# Patient Record
Sex: FEMALE | Race: WHITE
[De-identification: names, ages, dates, MRNs, and addresses within clinical notes are randomized per-mention and may not be internally consistent; named-entity substitution may affect disease eponyms.]

---

## 2022-10-31 ENCOUNTER — HOSPITAL ENCOUNTER (OUTPATIENT)
Dept: HOSPITAL 47 - RADMAMWWP | Age: 67
Discharge: HOME | End: 2022-10-31
Attending: INTERNAL MEDICINE
Payer: MEDICARE

## 2022-10-31 DIAGNOSIS — Z12.31: Primary | ICD-10-CM

## 2022-10-31 DIAGNOSIS — M85.88: ICD-10-CM

## 2022-10-31 DIAGNOSIS — N95.1: ICD-10-CM

## 2022-10-31 PROCEDURE — 77067 SCR MAMMO BI INCL CAD: CPT

## 2022-10-31 PROCEDURE — 77063 BREAST TOMOSYNTHESIS BI: CPT

## 2022-10-31 PROCEDURE — 77080 DXA BONE DENSITY AXIAL: CPT

## 2022-10-31 NOTE — BD
EXAMINATION TYPE: Axial Bone Density

 

DATE OF EXAM: 10/31/2022

 

COMPARISON: NONE

 

CLINICAL HISTORY: 67 years year old Female.  ICD-10 CODE: M85.88 DISRD BONE DENS

 

Height:  63

Weight:  223.9

 

FRAX RISK QUESTIONS:

Alcohol (3 or more units per day):  NO

Family History (Parent hip fracture):  NO

Glucocorticoids (More than 3mos):  NO

History of Fracture in Adulthood: FINGER

 

Secondary Osteoporosis:

  1.  Type 1 Diabetes: NO

  2.  Hyperthyroidism: NO

  3.  Menopause before 45: YES

  4.  Malnutrition: NO

  5.  Chronic liver disease: NO

Rheumatoid Arthritis: NO

Current Tobacco Use: NO

 

RISK FACTORS 

HISTORY OF: 

Hip Fracture (Right/Left): NO

Spine Fracture: NO

History of Wrist Fracture: NO

Surgery to Spine/Hip(right/left)/Wrist (right/left): NO

Family History of Osteoporosis: NO

Active: NO

Diet low in dairy products/other sources of calcium:  YES

Postmenopausal woman: YES

Take estrogen and/or progesterone medications: NO

Lost more than 2 inches in height since high school: NO

Frequent falls: NO

Poor Health: YES

Hyperparathyroidism: NO

Adrenal Insufficiency: NO

 

MEDICATIONS: 

Prednisone or other steroids: NO

Thyroid Medications:  SYNTHROID

How Long: PAST MONTH

Osteoporosis Medications: NO

Additional Medications: DIABETIC MEDS, VIT D

 

 

EXAM MEASUREMENTS: 

Bone mineral densitometry was performed using the Code42 System.

Bone mineral density as measured about the Lumbar spine is:  

----- L1-L4(G/cm2): 1.361

T Score Values are as follows:

----- L1: 1.3

----- L2: 1.2

----- L3: 1.9

----- L4: 1.5

----- L1-L4: 1.5

BASELINE STUDY 

 

Bone mineral density about the R hip (g/cm2): 0.711

Bone mineral density about the L hip (g/cm2): 0.741

T Score values are as follows:

-----R Neck: -2.3

-----L Neck: -2.1

-----R Total: -0.8

-----L Total: -0.1

BASELINE STUDY 

 

 

FRAX%s: The graph provided illustrates a 18.7% chance for a major osteoporotic fx and a 3.6% chance f
or the hips probability for fx in 10 years time.

 

IMPRESSION:

Osteopenia (T Score between -2.5 and -1).

 

There is slightly increased risk of fracture and the patient may be considered 

for treatment. 

 

Re-Screen 2-5 years.

 

NOTE:  T-SCORE=SD OF THE YOUNG ADULT MEAN.

## 2022-11-01 NOTE — MM
Reason for Exam: Screening  (asymptomatic). 

Baseline mammogram.





Patient History: 

Menarche at age 14. First Full-Term Pregnancy at age 22. Postmenopausal. 





Risk Values: 

Lucina 5 year model risk: 1.4%.

NCI Lifetime model risk: 4.8%.





Prior Study Comparison: 

Patient's first Mammogram. No prior studies available for comparison. 





Tissue Density: 

There are scattered fibroglandular densities.





Findings: 

Analyzed By CAD. 

There are benign-appearing round and vascular calcifications bilaterally identified.



There is no suspicious group of microcalcifications or suspicious mass in either breast. 





Overall Assessment: Benign, BI-RAD 2





Management: 

Screening Mammogram of both breasts in 1 year.

A clinical breast exam by your physician is recommended on an annual basis and results should be

correlated with mammographic findings.



Electronically signed and approved by: Girish Fischer M.D.

## 2022-11-18 ENCOUNTER — HOSPITAL ENCOUNTER (OUTPATIENT)
Dept: HOSPITAL 47 - OR | Age: 67
Discharge: HOME | End: 2022-11-18
Payer: MEDICARE

## 2022-11-18 VITALS — HEART RATE: 56 BPM

## 2022-11-18 VITALS — DIASTOLIC BLOOD PRESSURE: 88 MMHG | SYSTOLIC BLOOD PRESSURE: 190 MMHG

## 2022-11-18 VITALS — BODY MASS INDEX: 37.8 KG/M2

## 2022-11-18 VITALS — RESPIRATION RATE: 20 BRPM | TEMPERATURE: 97 F

## 2022-11-18 DIAGNOSIS — E11.9: ICD-10-CM

## 2022-11-18 DIAGNOSIS — Z79.84: ICD-10-CM

## 2022-11-18 DIAGNOSIS — E07.9: ICD-10-CM

## 2022-11-18 DIAGNOSIS — H25.11: Primary | ICD-10-CM

## 2022-11-18 DIAGNOSIS — Z79.890: ICD-10-CM

## 2022-11-18 DIAGNOSIS — Z79.899: ICD-10-CM

## 2022-11-18 LAB — GLUCOSE BLD-MCNC: 94 MG/DL (ref 70–110)

## 2022-11-18 PROCEDURE — 66984 XCAPSL CTRC RMVL W/O ECP: CPT

## 2022-11-18 RX ADMIN — CYCLOPENTOLATE HYDROCHLORIDE PRN DROPS: 10 SOLUTION/ DROPS OPHTHALMIC at 11:33

## 2022-11-18 RX ADMIN — PHENYLEPHRINE HYDROCHLORIDE PRN DROPS: 25 SOLUTION/ DROPS OPHTHALMIC at 11:49

## 2022-11-18 RX ADMIN — CYCLOPENTOLATE HYDROCHLORIDE PRN DROPS: 10 SOLUTION/ DROPS OPHTHALMIC at 11:44

## 2022-11-18 RX ADMIN — PHENYLEPHRINE HYDROCHLORIDE PRN DROPS: 25 SOLUTION/ DROPS OPHTHALMIC at 11:38

## 2022-11-18 RX ADMIN — PHENYLEPHRINE HYDROCHLORIDE PRN DROPS: 25 SOLUTION/ DROPS OPHTHALMIC at 11:29

## 2022-11-18 RX ADMIN — CYCLOPENTOLATE HYDROCHLORIDE PRN DROPS: 10 SOLUTION/ DROPS OPHTHALMIC at 11:26

## 2022-11-18 NOTE — P.OP
Date of Procedure: 11/18/22


Preoperative Diagnosis: 


NS & PSC


Postoperative Diagnosis: 


same


Procedure(s) Performed: 


PIOL< OD


Implants: 


MX60E 21.00


Anesthesia: MAC


Surgeon: Rafa Jerez


Pathology: none sent


Condition: stable


Disposition: same day


Indications for Procedure: 


bluerry vision


Operative Findings: 


no complications

## 2022-11-19 NOTE — OP
OPERATIVE REPORT



PROCEDURES PERFORMED:

Phacoemulsification of cataract and intraocular lens implant of the right eye.



PREOPERATIVE DIAGNOSIS:

Nuclear sclerosis and posterior subcapsular cataract.



PREOPERATIVE DIAGNOSIS:

Nuclear sclerosis and posterior subcapsular cataract.



ESTIMATED BLOOD LOSS:

Zero.



SPECIMEN TAKEN:

None.



NARRATIVE:

After obtaining the appropriate consent, the patient was brought to the operating room

where the patient was placed under cardiac monitoring and prepped and draped in the

usual sterile manner.  At the 11 o'clock position, a 15-degree super sharp blade was

used to create a paracentesis followed by instillation of 1% Xylocaine MPF 50:50 mix

with BSS into the anterior chamber.  This was followed by Duovisc viscoelastic to

stabilize the anterior chamber.  At the 9 o'clock position a self-sealing corneal flap

incision was created using 2.8 mm augie keratome.  A cystotome was used to initiate a

continuous tear capsulorrhexis which was completed with the Utrata forceps.  A

Binkhorst cannula was used to hydrodissect the lens nucleus followed by

hydrodelineation.  Phacoemulsification of the lens was performed utilizing phacochop in

33.91 seconds at 21% power.  The remaining cortical material was removed using the

irrigation aspiration mode followed by additional 1% Xylocaine MPF into the anterior

chamber followed by viscoelastic to stabilize the capsular bag.  A Bausch and Lomb

MX60E 21.0 diopters posterior chamber lens was placed into the capsular bag without

difficulty.  The remaining viscoelastic material was removed from the anterior chamber

with the irrigation/aspiration.  Balanced salt solution was used to normalize the

intraocular pressure.  The incision was checked for watertight integrity.  The patient

then received 2 drops of 0.5% timolol followed by 2 drops Vigamox, was lightly patched

and shielded in the usual manner.  There were no complications from the procedure.  The

patient tolerated the procedure well and was returned to recovery in good condition.





MMODL / IJN: [id number] / Job#: [id number]

## 2022-12-14 ENCOUNTER — HOSPITAL ENCOUNTER (OUTPATIENT)
Dept: HOSPITAL 47 - OR | Age: 67
Discharge: HOME | End: 2022-12-14
Payer: MEDICARE

## 2022-12-14 VITALS — SYSTOLIC BLOOD PRESSURE: 156 MMHG | HEART RATE: 65 BPM | DIASTOLIC BLOOD PRESSURE: 74 MMHG

## 2022-12-14 VITALS — RESPIRATION RATE: 16 BRPM | TEMPERATURE: 97.8 F

## 2022-12-14 DIAGNOSIS — H25.042: ICD-10-CM

## 2022-12-14 DIAGNOSIS — E11.9: ICD-10-CM

## 2022-12-14 DIAGNOSIS — E07.9: ICD-10-CM

## 2022-12-14 DIAGNOSIS — H25.12: Primary | ICD-10-CM

## 2022-12-14 DIAGNOSIS — Z79.890: ICD-10-CM

## 2022-12-14 DIAGNOSIS — Z79.84: ICD-10-CM

## 2022-12-14 LAB — GLUCOSE BLD-MCNC: 95 MG/DL (ref 70–110)

## 2022-12-14 PROCEDURE — 66984 XCAPSL CTRC RMVL W/O ECP: CPT

## 2022-12-14 RX ADMIN — CYCLOPENTOLATE HYDROCHLORIDE PRN DROPS: 10 SOLUTION/ DROPS OPHTHALMIC at 06:53

## 2022-12-14 RX ADMIN — PHENYLEPHRINE HYDROCHLORIDE PRN DROPS: 25 SOLUTION/ DROPS OPHTHALMIC at 07:02

## 2022-12-14 RX ADMIN — PHENYLEPHRINE HYDROCHLORIDE PRN DROPS: 25 SOLUTION/ DROPS OPHTHALMIC at 06:50

## 2022-12-14 RX ADMIN — CYCLOPENTOLATE HYDROCHLORIDE PRN DROPS: 10 SOLUTION/ DROPS OPHTHALMIC at 06:47

## 2022-12-14 RX ADMIN — CYCLOPENTOLATE HYDROCHLORIDE PRN DROPS: 10 SOLUTION/ DROPS OPHTHALMIC at 06:59

## 2022-12-14 RX ADMIN — PHENYLEPHRINE HYDROCHLORIDE PRN DROPS: 25 SOLUTION/ DROPS OPHTHALMIC at 06:56

## 2022-12-14 NOTE — P.OP
Date of Procedure: 12/14/22


Preoperative Diagnosis: 


NS & PSC


Postoperative Diagnosis: 


brenna


Procedure(s) Performed: 


PIOL< OS


Implants: 


MX60E 21.50


Anesthesia: MAC


Surgeon: Rafa Jerez


Pathology: none sent


Condition: stable


Disposition: same day


Indications for Procedure: 


blurry vision


Operative Findings: 


no complications

## 2022-12-15 NOTE — OP
OPERATIVE REPORT



PROCEDURES PERFORMED:

Phacoemulsification of cataract and intraocular lens implant of the left eye.



PREOPERATIVE DIAGNOSES:

Nuclear sclerosis and posterior subcapsular cataract.



POSTOPERATIVE DIAGNOSES:

Nuclear sclerosis and posterior subcapsular cataract.



ESTIMATED BLOOD LOSS:

Zero.



SPECIMEN TAKEN:

None.



NARRATIVE:

After obtaining the appropriate consent, the patient was brought to the operating room

where the patient was placed under cardiac monitoring and prepped and draped in the

usual sterile manner.  At the 5 o'clock position, a 15-degree super sharp blade was

used to create a paracentesis followed by instillation of 1% Xylocaine MPF 50:50 mix

with BSS into the anterior chamber.  This was followed by DuoVisc viscoelastic to

stabilize the anterior chamber.  At the 3 o'clock position a self-sealing corneal flap

incision was created using 2.8 mm augie keratome.  A cystotome was used to initiate a

continuous tear capsulorrhexis which was completed with the Utrata forceps.  A

Binkhorst cannula was used to hydrodissect the lens nucleus followed by

hydrodelineation.  Phacoemulsification of the lens was performed utilizing phacochop in

32.84 seconds at 30% power.  The remaining cortical material was removed using the

irrigation aspiration mode followed by additional 1% Xylocaine MPF into the anterior

chamber followed by viscoelastic to stabilize the capsular bag.  A Bausch and Lomb

MX60E 21.5 diopter posterior chamber lens was placed into the capsular bag without

difficulty.  The remaining viscoelastic material was removed from the anterior chamber

with the irrigation/aspiration.  Balanced salt solution was used to normalize the

intraocular pressure.  The incision was checked for watertight integrity.  The patient

then received 2 drops of 0.5% timolol followed by 2 drops Vigamox, was lightly patched

and shielded in the usual manner.  There were no complications from the procedure.  The

patient tolerated the procedure well and was returned to recovery in good condition.





MMODL / IJN: [id number] / Job#: [id number]

## 2023-08-28 ENCOUNTER — HOSPITAL ENCOUNTER (OUTPATIENT)
Dept: HOSPITAL 47 - RADMRIMAIN | Age: 68
Discharge: HOME | End: 2023-08-28
Attending: INTERNAL MEDICINE
Payer: MEDICARE

## 2023-08-28 DIAGNOSIS — R41.3: ICD-10-CM

## 2023-08-28 DIAGNOSIS — R90.82: ICD-10-CM

## 2023-08-28 DIAGNOSIS — R51.9: ICD-10-CM

## 2023-08-28 DIAGNOSIS — G31.9: Primary | ICD-10-CM

## 2023-08-28 PROCEDURE — 70553 MRI BRAIN STEM W/O & W/DYE: CPT

## 2023-08-28 NOTE — MR
EXAMINATION TYPE: MR brain wo/w con

 

DATE OF EXAM: 8/28/2023

 

COMPARISON: NONE

 

HISTORY: Memory loss, headaches

 

TECHNIQUE: 

Multiplanar, multisequence images of the brain and brainstem is performed without and with IV contras
t, utilizing 10 mL intravenous Gadavist .

 

FINDINGS: Diffusion weighted images demonstrate no evidence of a recent infarct or other diffusion ab
normality.  There is mild to moderate ventricular and sulcal prominence. There are scattered foci of 
T2 hyperintensity seen throughout the superficial, deep, and periventricular white matter bilaterally
. Approximately 30-40 scattered lesions are seen. Lesions are nonspecific in appearance and distribut
ion.

 

Midline structures demonstrate normal morphology.  The craniocervical junction appears within normal 
limits.  Post contrast images demonstrate no abnormal enhancement. The dural venous sinuses appear pa
tent. Mild mucosal thickening involving ethmoid sinuses bilaterally. Bilateral aphakia is present.

 

IMPRESSION: Mild to moderate diffuse cerebral atrophy and moderate nonspecific white matter changes p
resumed on the basis of product of chronic small vessel ischemic change. No abnormal enhancement is s
een.

## 2025-01-27 ENCOUNTER — HOSPITAL ENCOUNTER (INPATIENT)
Dept: HOSPITAL 47 - EC | Age: 70
LOS: 7 days | Discharge: NURSING, REHAB OR LONG-TERM CARE | DRG: 689 | End: 2025-02-03
Attending: INTERNAL MEDICINE | Admitting: INTERNAL MEDICINE
Payer: COMMERCIAL

## 2025-01-27 VITALS — BODY MASS INDEX: 26.6 KG/M2

## 2025-01-27 DIAGNOSIS — E11.22: ICD-10-CM

## 2025-01-27 DIAGNOSIS — Z16.19: ICD-10-CM

## 2025-01-27 DIAGNOSIS — R29.707: ICD-10-CM

## 2025-01-27 DIAGNOSIS — K14.8: ICD-10-CM
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
## 2025-01-27 DIAGNOSIS — R29.705: ICD-10-CM

## 2025-01-27 DIAGNOSIS — Z79.82: ICD-10-CM

## 2025-01-27 DIAGNOSIS — Z79.899: ICD-10-CM

## 2025-01-27 DIAGNOSIS — R29.6: ICD-10-CM

## 2025-01-27 DIAGNOSIS — R29.810: ICD-10-CM

## 2025-01-27 DIAGNOSIS — I12.9: ICD-10-CM

## 2025-01-27 DIAGNOSIS — R55: ICD-10-CM

## 2025-01-27 DIAGNOSIS — Z79.84: ICD-10-CM

## 2025-01-27 DIAGNOSIS — Z87.440: ICD-10-CM

## 2025-01-27 DIAGNOSIS — Z96.659: ICD-10-CM

## 2025-01-27 DIAGNOSIS — N39.0: Primary | ICD-10-CM

## 2025-01-27 DIAGNOSIS — Z79.890: ICD-10-CM

## 2025-01-27 DIAGNOSIS — I63.40: ICD-10-CM

## 2025-01-27 DIAGNOSIS — Z91.81: ICD-10-CM

## 2025-01-27 DIAGNOSIS — Z16.12: ICD-10-CM

## 2025-01-27 DIAGNOSIS — G93.89: ICD-10-CM

## 2025-01-27 DIAGNOSIS — F03.90: ICD-10-CM

## 2025-01-27 DIAGNOSIS — E03.9: ICD-10-CM

## 2025-01-27 DIAGNOSIS — Z79.02: ICD-10-CM

## 2025-01-27 DIAGNOSIS — F88: ICD-10-CM

## 2025-01-27 DIAGNOSIS — G81.91: ICD-10-CM

## 2025-01-27 DIAGNOSIS — N18.32: ICD-10-CM

## 2025-01-27 DIAGNOSIS — Z16.24: ICD-10-CM

## 2025-01-27 DIAGNOSIS — E78.5: ICD-10-CM

## 2025-01-27 DIAGNOSIS — I08.1: ICD-10-CM

## 2025-01-27 DIAGNOSIS — I65.23: ICD-10-CM
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
## 2025-01-27 DIAGNOSIS — Z86.73: ICD-10-CM

## 2025-01-27 LAB
ALBUMIN SERPL-MCNC: 4.2 G/DL (ref 3.5–5)
ALP SERPL-CCNC: 74 U/L (ref 38–126)
ALT SERPL-CCNC: 11 U/L (ref 4–34)
ANION GAP SERPL CALC-SCNC: 11 MMOL/L
AST SERPL-CCNC: 46 U/L (ref 14–36)
BASOPHILS # BLD AUTO: 0 K/UL (ref 0–0.2)
BASOPHILS NFR BLD AUTO: 0 %
BUN SERPL-SCNC: 25 MG/DL (ref 7–17)
CALCIUM SPEC-MCNC: 9.9 MG/DL (ref 8.4–10.2)
CHLORIDE SERPL-SCNC: 108 MMOL/L (ref 98–107)
CK SERPL-CCNC: 524 U/L (ref 30–135)
CO2 SERPL-SCNC: 22 MMOL/L (ref 22–30)
EOSINOPHIL # BLD AUTO: 0.1 K/UL (ref 0–0.7)
EOSINOPHIL NFR BLD AUTO: 1 %
ERYTHROCYTE [DISTWIDTH] IN BLOOD BY AUTOMATED COUNT: 4.68 M/UL (ref 3.8–5.4)
ERYTHROCYTE [DISTWIDTH] IN BLOOD: 13.5 % (ref 11.5–15.5)
FATTY CASTS #/AREA UR COMP ASSIST: 2 /LPF
GLUCOSE SERPL-MCNC: 110 MG/DL (ref 74–99)
HCT VFR BLD AUTO: 41.4 % (ref 34–46)
HGB BLD-MCNC: 12.9 GM/DL (ref 11.4–16)
LYMPHOCYTES # SPEC AUTO: 1.5 K/UL (ref 1–4.8)
LYMPHOCYTES NFR SPEC AUTO: 11 %
MCH RBC QN AUTO: 27.6 PG (ref 25–35)
MCHC RBC AUTO-ENTMCNC: 31.2 G/DL (ref 31–37)
MCV RBC AUTO: 88.5 FL (ref 80–100)
MONOCYTES # BLD AUTO: 0.8 K/UL (ref 0–1)
MONOCYTES NFR BLD AUTO: 6 %
NEUTROPHILS # BLD AUTO: 11.1 K/UL (ref 1.3–7.7)
NEUTROPHILS NFR BLD AUTO: 82 %
PH UR: 5.5 [PH] (ref 5–8)
PLATELET # BLD AUTO: 335 K/UL (ref 150–450)
POTASSIUM SERPL-SCNC: 5.4 MMOL/L (ref 3.5–5.1)
PROT SERPL-MCNC: 8.1 G/DL (ref 6.3–8.2)
PROT UR QL: (no result)
RBC UR QL: 8 /HPF (ref 0–5)
SODIUM SERPL-SCNC: 141 MMOL/L (ref 137–145)
SP GR UR: 1.02 (ref 1–1.03)
SQUAMOUS UR QL AUTO: 3 /HPF (ref 0–4)
UROBILINOGEN UR QL STRIP: <2 MG/DL (ref ?–2)
WBC # BLD AUTO: 13.6 K/UL (ref 3.8–10.6)
WBC # UR AUTO: 132 /HPF (ref 0–5)

## 2025-01-27 PROCEDURE — 3E03317 INTRODUCTION OF OTHER THROMBOLYTIC INTO PERIPHERAL VEIN, PERCUTANEOUS APPROACH: ICD-10-PCS

## 2025-01-27 PROCEDURE — 72170 X-RAY EXAM OF PELVIS: CPT

## 2025-01-27 PROCEDURE — 80048 BASIC METABOLIC PNL TOTAL CA: CPT

## 2025-01-27 PROCEDURE — 85025 COMPLETE CBC W/AUTO DIFF WBC: CPT

## 2025-01-27 PROCEDURE — 80053 COMPREHEN METABOLIC PANEL: CPT

## 2025-01-27 PROCEDURE — 87077 CULTURE AEROBIC IDENTIFY: CPT

## 2025-01-27 PROCEDURE — 93312 ECHO TRANSESOPHAGEAL: CPT

## 2025-01-27 PROCEDURE — 93880 EXTRACRANIAL BILAT STUDY: CPT

## 2025-01-27 PROCEDURE — 70498 CT ANGIOGRAPHY NECK: CPT

## 2025-01-27 PROCEDURE — 70496 CT ANGIOGRAPHY HEAD: CPT

## 2025-01-27 PROCEDURE — 82550 ASSAY OF CK (CPK): CPT

## 2025-01-27 PROCEDURE — 96374 THER/PROPH/DIAG INJ IV PUSH: CPT

## 2025-01-27 PROCEDURE — 93970 EXTREMITY STUDY: CPT

## 2025-01-27 PROCEDURE — 93306 TTE W/DOPPLER COMPLETE: CPT

## 2025-01-27 PROCEDURE — 87186 SC STD MICRODIL/AGAR DIL: CPT

## 2025-01-27 PROCEDURE — 71046 X-RAY EXAM CHEST 2 VIEWS: CPT

## 2025-01-27 PROCEDURE — 96375 TX/PRO/DX INJ NEW DRUG ADDON: CPT

## 2025-01-27 PROCEDURE — 83036 HEMOGLOBIN GLYCOSYLATED A1C: CPT

## 2025-01-27 PROCEDURE — 36415 COLL VENOUS BLD VENIPUNCTURE: CPT

## 2025-01-27 PROCEDURE — 80061 LIPID PANEL: CPT

## 2025-01-27 PROCEDURE — 93325 DOPPLER ECHO COLOR FLOW MAPG: CPT

## 2025-01-27 PROCEDURE — 99285 EMERGENCY DEPT VISIT HI MDM: CPT

## 2025-01-27 PROCEDURE — 93320 DOPPLER ECHO COMPLETE: CPT

## 2025-01-27 PROCEDURE — 70450 CT HEAD/BRAIN W/O DYE: CPT

## 2025-01-27 PROCEDURE — 70551 MRI BRAIN STEM W/O DYE: CPT

## 2025-01-27 PROCEDURE — 85610 PROTHROMBIN TIME: CPT

## 2025-01-27 PROCEDURE — 93270 REMOTE 30 DAY ECG REV/REPORT: CPT

## 2025-01-27 PROCEDURE — 85730 THROMBOPLASTIN TIME PARTIAL: CPT

## 2025-01-27 PROCEDURE — 51702 INSERT TEMP BLADDER CATH: CPT

## 2025-01-27 PROCEDURE — 96361 HYDRATE IV INFUSION ADD-ON: CPT

## 2025-01-27 PROCEDURE — 81001 URINALYSIS AUTO W/SCOPE: CPT

## 2025-01-27 PROCEDURE — 93005 ELECTROCARDIOGRAM TRACING: CPT

## 2025-01-27 PROCEDURE — 76770 US EXAM ABDO BACK WALL COMP: CPT

## 2025-01-27 PROCEDURE — 87086 URINE CULTURE/COLONY COUNT: CPT

## 2025-01-27 PROCEDURE — 72125 CT NECK SPINE W/O DYE: CPT

## 2025-01-27 RX ADMIN — MIRTAZAPINE SCH MG: 15 TABLET, FILM COATED ORAL at 20:24

## 2025-01-27 RX ADMIN — CEFAZOLIN SCH MLS/HR: 330 INJECTION, POWDER, FOR SOLUTION INTRAMUSCULAR; INTRAVENOUS at 15:52

## 2025-01-27 RX ADMIN — DONEPEZIL HYDROCHLORIDE SCH MG: 10 TABLET ORAL at 20:23

## 2025-01-27 RX ADMIN — CEFTRIAXONE SODIUM STA MG: 1 INJECTION, POWDER, FOR SOLUTION INTRAMUSCULAR; INTRAVENOUS at 15:04

## 2025-01-27 RX ADMIN — MORPHINE SULFATE STA MG: 4 INJECTION, SOLUTION INTRAMUSCULAR; INTRAVENOUS at 15:08

## 2025-01-27 NOTE — XR
EXAMINATION TYPE: XR chest 2V, XR knee 4V RT, XR pelvis AP view, XR foot complete 3 views LT

 

DATE OF EXAM: 1/27/2025 2:54 PM

 

COMPARISON: None

 

CLINICAL INDICATION: Female, 69 years old with pain after history of fall, , 

 

 

 

FINDINGS:  

 

Chest:

Are normal size. Aorta and pulmonary vasculature within normal limits. Hazy lung densities likely due
 to portable technique and body habitus. Lateral view shows no gneie consolidation or pleural effusio
n.

 

Pelvis:

Limited by osteopenia and body habitus. There is mild degenerative spurring of both hips. Possible si
milar subtle irregularity of the arcuate lines of the right side of the sacrum. Otherwise, no acute f
racture, subluxation, or dislocation seen.

 

Right knee:

Tricompartmental degenerative spurring. Extensor mechanism appears intact. Joint space narrowing medi
al compartment. Suggestion of some bony remodeling compartment as well. No displaced fracture.

 

Left foot:

Mild degenerative change first MTP joint. Mild hallux valgus and mild bunion formation. Type I versus
 type II accessory navicular noted. Vascular calcifications. Small plantar heel spur.

 

 

 

IMPRESSION:  

 

1. Chest: Technical limitations. No definite acute process.

 

2. Pelvis: Mild bilateral hip OA. Possible very subtle step-off along an arcuate line of the right si
de of the sacrum. An underlying sacral insufficiency fracture is difficult to entirely exclude. Corre
late for any localizing pain.

 

3. Right knee: Tricompartment osteoarthrosis, suspect moderate to severe in the medial compartment. N
o acute osseous abnormality seen.

 

4. Left foot: Mild hallux valgus with bunion. Plantar heel spurs. No acute osseous abnormality seen.

 

[facility], Workstation: [email or web address], 1/27/2025 3:09 PM

## 2025-01-27 NOTE — ED
General Adult HPI





- General


Chief complaint: Fall


Stated complaint: Fall


Time Seen by Provider: 01/27/25 12:28


Source: EMS


Mode of arrival: EMS


Limitations: altered mental status





- History of Present Illness


Initial comments: 


Dictation was produced using dragon dictation software. please excuse any 

grammatical, word or spelling errors. 











Chief Complaint: 69-year-old female with weakness and fall





History of Present Illness: Patient 69-year-old female she fell last night will 

could not get up.  [facility] tried to contact her and when they were having 

trouble they contacted patient's daughter.  Other family member went to check on

the patient she was found on the ground.  Patient states that she got up in the 

middle the night to use the bathroom and was too weak to get up.  Patient was on

the ground for several hours on carpeted floor.  Complains of left foot pain.  

Denies any other complaints.  Patient has not taken anticoagulation medications.

 Denies any head or neck injury.








The ROS documented in this emergency department record has been reviewed and 

confirmed by me.  Those systems with pertinent positive or negative responses 

have been documented in the HPI.  All other systems are other negative and/or 

noncontributory.




















- Related Data


                                    Allergies











Allergy/AdvReac Type Severity Reaction Status Date / Time


 


No Known Allergies Allergy   Verified 12/14/22 07:05














Review of Systems


ROS Statement: 


Those systems with pertinent positive or pertinent negative responses have been 

documented in the HPI.





ROS Other: All systems not noted in ROS Statement are negative.





Past Medical History


Past Medical History: Diabetes Mellitus, Eye Disorder, Thyroid Disorder


History of Any Multi-Drug Resistant Organisms: None Reported


Additional Past Surgical History / Comment(s): rt eye catartact removal


Past Anesthesia/Blood Transfusion Reactions: No Reported Reaction


Past Psychological History: No Psychological Hx Reported


Smoking Status: Never smoker





General Exam





- General Exam Comments


Initial Comments: 











PHYSICAL EXAM:


General Impression: Alert and oriented x3, not in acute distress


HEENT: Normocephalic atraumatic, extra-ocular movements intact, pupils equal and

reactive to light bilaterally, mucous membranes moist.


Cardiovascular: Heart regular rate and rhythm


Chest: Able to complete full sentences, no retractions, no tachypnea


Abdomen: abdomen soft, non-tender, non-distended, no organomegaly


Musculoskeletal: Pulses present and equal in all extremities, no peripheral e

rodrigo


Motor:  no focal deficits noted


Neurological: CN II-XII grossly intact, no focal motor or sensory deficits noted


Skin: Intact with no visualized rashes


Psych: Normal affect and mood











Limitations: altered mental status





Course


                                   Vital Signs











  01/27/25 01/27/25 01/27/25





  12:19 14:23 15:25


 


Temperature 99 F  98.2 F


 


Pulse Rate 70 70 78


 


Respiratory 16 16 16





Rate   


 


Blood Pressure 188/66 158/73 106/64


 


O2 Sat by Pulse 98 98 93 L





Oximetry   














EKG Findings





- EKG Comments:


EKG Findings:: My EKG interpretation: Ventricular rate 71, sinus rhythm,.  186, 

QRS 84, QTc 435. No [place] prolongation, no QTC prolongation, no ST or T-wave 

changes noted.  Overall, this EKG is unremarkable





Medical Decision Making





- Medical Decision Making


Was pt. sent in by a medical professional or institution (, PA, NP, urgent 

care, hospital, or nursing home...) When possible be specific


@  -No


Did you speak to anyone other than the patient for history (EMS, parent, family,

police, friend...)? What history was obtained from this source 


@  -No


Did you review nursing and triage notes (agree or disagree)?  Why? 


@  -I reviewed and agree with nursing and triage notes


Were old charts reviewed (outside hosp., previous admission, EMS record, old 

EKG, old radiological studies, urgent care reports/EKG's, nursing home records)?

Report findings 


@  -No old charts were reviewed


Differential Diagnosis (chest pain, altered mental status, abdominal pain women,

abdominal pain men, vaginal bleeding, musculoskeletal, weakness, fever, dyspnea,

syncope, headache, dizziness, GI bleed, back pain, seizure, CVA, palpatations, 

mental health)? 


@  -Differential Weakness:


Hypoglycemia, shock, sepsis, hyponatremia, anemia, infection, MI, ETOH, adverse 

medicine reaction, overdose, stroke, this is not meant to be an all-inclusive l

ist.





EKG interpreted by me (3pts min.).


@  -See above


X-rays interpreted by me (1pt min.).


@  -Pelvis x-ray, knee x-ray, foot x-ray and chest x-ray shows no acute 

processes


CT interpreted by me (1pt min.).


@  -CT brain and C-spine shows no acute processes


U/S interpreted by me (1pt. min.).


@  -None done


What testing was considered but not performed or refused? (CT, X-rays, U/S, 

labs)? Why?


@  -None


What meds were considered but not given or refused? Why?


@  -None


Was smoking cessation discussed for >3mins.?


@  -No


Were there social determinants of health that impacted care today? How? (Homele

ssness, low income, unemployed, alcoholism, drug addiction, transportation, low 

edu. Level, literacy, decrease access to med. care, [facility], rehab)?


@  -No


Was there de-escalation of care discussed even if they declined (Discuss DNR or 

withdrawal of care, Hospice)? DNR status


@  -No


What co-morbidities impacted this encounter? (DM, HTN, Smoking, COPD, CAD, 

Cancer, CVA, ARF, Chemo, Hep., AIDS, mental health diagnosis, sleep apnea, 

morbid obesity)?


@  -Dementia


Was patient admitted / discharged? Hospital course, mention meds given and 

route, prescriptions, significant lab abnormalities, going to OR and other 

pertinent info.


@  -69-year-old female presents to the emergency department with fall and 

weakness.  Vital signs upon arrival are within acceptable limits.  Imaging 

studies are negative.  Laboratory evaluation obtained.  Patient has nitrite 

positive UTI.  Given patient poor social situation lives on her own without 

significant assistance family request hospital admission for inpatient t

reatment.  Patient given ceftriaxone Case discussed with hospitalist for 

admission


Did you discuss the management of the patient with other professionals 

(professionals i.e. , PA, NP, lab, RT, psych nurse, , , 

teacher, , )? Give summary


@  -Case discussed with hospitalist for admission


Was critical care preformed (if so, how long)?


@  -No


Undiagnosed new problem with uncertain prognosis?


@  -No


Drug Therapy requiring intensive monitoring for toxicity (Heparin, Nitro, 

Insulin, Cardizem)?


@  -No


Were any procedures done?


@  -No


Diagnosis/symptom?  Acute, or Chronic, or Acute on Chronic?  Uncomplicated 

(without systemic symptoms) or Complicated (systemic symptoms)?


@  -Acute UTI complicated by generalized weakness


Side effects of treatment?


@  -No


Exacerbation, Progression, or Severe Exacerbation?


@  -No


Poses a threat to life or bodily function? How? (Chest pain, USA, MI, pneumonia,

PE, COPD, DKA, ARF, appy, cholecystitis, CVA, Diverticulitis, Homicidal, 

Suicidal, threat to staff... and all critical care pts)


@  -Yes











- Lab Data


Result diagrams: 


                                 01/27/25 13:08





                                 01/27/25 13:08


                                   Lab Results











  01/27/25 01/27/25 01/27/25 Range/Units





  13:08 13:08 13:27 


 


WBC  13.6 H    (3.8-10.6)  k/uL


 


RBC  4.68    (3.80-5.40)  m/uL


 


Hgb  12.9    (11.4-16.0)  gm/dL


 


Hct  41.4    (34.0-46.0)  %


 


MCV  88.5    (80.0-100.0)  fL


 


MCH  27.6    (25.0-35.0)  pg


 


MCHC  31.2    (31.0-37.0)  g/dL


 


RDW  13.5    (11.5-15.5)  %


 


Plt Count  335    (150-450)  k/uL


 


MPV  7.9    


 


Neutrophils %  82    %


 


Lymphocytes %  11    %


 


Monocytes %  6    %


 


Eosinophils %  1    %


 


Basophils %  0    %


 


Neutrophils #  11.1 H    (1.3-7.7)  k/uL


 


Lymphocytes #  1.5    (1.0-4.8)  k/uL


 


Monocytes #  0.8    (0-1.0)  k/uL


 


Eosinophils #  0.1    (0-0.7)  k/uL


 


Basophils #  0.0    (0-0.2)  k/uL


 


Sodium   141   (137-145)  mmol/L


 


Potassium   5.4 H   (3.5-5.1)  mmol/L


 


Chloride   108 H   ()  mmol/L


 


Carbon Dioxide   22   (22-30)  mmol/L


 


Anion Gap   11   mmol/L


 


BUN   25 H   (7-17)  mg/dL


 


Creatinine   1.25 H   (0.52-1.04)  mg/dL


 


Est GFR (CKD-EPI)AfAm   51   (>60 ml/min/1.73 sqM)  


 


Est GFR (CKD-EPI)NonAf   44   (>60 ml/min/1.73 sqM)  


 


Glucose   110 H   (74-99)  mg/dL


 


Calcium   9.9   (8.4-10.2)  mg/dL


 


Total Bilirubin   1.2   (0.2-1.3)  mg/dL


 


AST   46 H   (14-36)  U/L


 


ALT   11   (4-34)  U/L


 


Alkaline Phosphatase   74   ()  U/L


 


Creatine Kinase   524 H   ()  U/L


 


Total Protein   8.1   (6.3-8.2)  g/dL


 


Albumin   4.2   (3.5-5.0)  g/dL


 


Urine Color    Yellow  


 


Urine Appearance    Cloudy H  (Clear)  


 


Urine pH    5.5  (5.0-8.0)  


 


Ur Specific Gravity    1.018  (1.001-1.035)  


 


Urine Protein    1+ H  (Negative)  


 


Urine Glucose (UA)    Negative  (Negative)  


 


Urine Ketones    Negative  (Negative)  


 


Urine Blood    Small H  (Negative)  


 


Urine Nitrite    Positive H  (Negative)  


 


Urine Bilirubin    Negative  (Negative)  


 


Urine Urobilinogen    <2.0  (<2.0)  mg/dL


 


Ur Leukocyte Esterase    Large H  (Negative)  


 


Urine RBC    8 H  (0-5)  /hpf


 


Urine WBC    132 H  (0-5)  /hpf


 


Urine WBC Clumps    Moderate H  (None)  /hpf


 


Ur Squamous Epith Cells    3  (0-4)  /hpf


 


Amorphous Sediment    Rare H  (None)  /hpf


 


Urine Bacteria    Many H  (None)  /hpf


 


Fatty Casts    2  (0)  /lpf


 


Urine Mucus    Occasional H  (None)  /hpf














Disposition


Clinical Impression: 


 Urinary tract infection





Disposition: ADMITTED AS IP TO THIS HOSP


Condition: Fair


Referrals: 


John Fox MD [Primary Care Provider] - 1-2 days


Decision Time: 15:19

## 2025-01-27 NOTE — CT
EXAMINATION TYPE: CT brain cspine wo con

 

DATE OF EXAM: 1/27/2025 3:00 PM

 

COMPARISON: None. 

 

CLINICAL INDICATION: Female, 69 years old with history of fall, PAIN AFTER FALL, pain

 

TECHNIQUE: CT of the brain is performed utilizing 3 mm thick sections through the posterior fossa and
 3 mm thick sections through the remaining calvarium.  Study is performed within 24 hours of arrival 
to the hospital.

Contrast used: mL of , (none if empty)

CT DLP: 1384.5 mGycm, Automated exposure control for dose reduction was used.

 

FINDINGS:

No abnormal hyperdensity is present to suggest an acute intracranial hemorrhage.

No mass lesion is evident. Physiologic right basal ganglion calcifications present.

No acute infarcts are evident.  Mild periventricular white matter hypodensity is present, likely on t
he basis of chronic white matter ischemic changes.

Ventricles and sulci are appropriate for the patient age.  

No acute fractures are evident.

Paranasal sinuses and mastoid air cells within the field-of-view are clear. 

 

IMPRESSIONS:

1. No acute intracranial process. Follow-up MRI can be performed as clinically indicated.

 

==============================================================

CT cervical spine.

 

COMPARISON: None

 

TECHNIQUE: CT of the cervical spine is performed in the axial plane at 2 mm thick sections.  Reconstr
ucted images in the coronal, and sagittal plane are reviewed on the computer. 

 

FINDINGS:

No acute fractures are evident.

Vertebral body alignment is normal.

Degenerative disc changes are present throughout cervical spine. Spondylosis is present.

Vertebral body heights are preserved.

No spinal canal stenosis is evident.

Some foraminal narrowing from uncovertebral joint hypertrophy is present at C6-7. Some left C5-6 fora
katya narrowing is present. Moderate left C4-5 foraminal stenosis is present. Mild left C3-4 foramina
l narrowing is present 

 

IMPRESSION:

1. No acute osseous abnormality cervical spine.

2. Chronic appearing degenerative disc changes throughout the cervical spine.

3. Mild foraminal narrowing within the cervical spine, greater on the left. The most severe appears t
o be moderate at the left C4-5 foramen.

 

[facility], Workstation: [email or web address], 1/27/2025 3:32 PM

## 2025-01-27 NOTE — P.HPIM
History of Present Illness


H&P Date: 01/27/25


History of present illness;


Patient is a 69-year-old female with a past medical history significant for 

diabetes mellitus, thyroid disorder.  She presented to the emergency department 

after falling at home.  She states that she has frequent falls, with the most 

recent one being last night which brought her to the emergency department today.

 She states that she was in her apartment and attempting to clean her apartment,

at that time she states that she lost her footing and fell on her left side.  

She states that she did not hit her head, however she does attest to having felt

some dizziness prior to falling.  She was found on the ground by her son-in-law,

and she is unsure of how long she was on the ground for but believes it to be at

least 2 hours stating that she was too weak to get herself up.  She has no acute

pain anywhere on her body, however notes some generalized soreness in her lower 

extremities and her right shoulder.  She lives alone and states that she is able

to handle most activities of daily living by herself.  She states that she has 

not taken any anticoagulation medications.





Labratory review: 


-WBCs 13.6; sodium 141, potassium 5.4, chloride 108, BUN 25, creatinine 1.25, 

creatinine kinase of 524


-Urinalysis was cloudy in appearance with 1+ protein, small amount of blood, 

positive nitrites, large amount leukocyte esterase


-Respiratory viral panel all negative


 


Imaging: 


-Chest x-ray showed no definite acute process


-Pelvic x-ray showed mild bilateral hip osteoarthritis with a possible very 

subtle step-off along the arcuate line of the right side of the sacrum; 

underlying sacral insufficiency fracture is difficult to entirely exclude


-Right knee x-ray showed tricompartment osteoarthritis without acute osseous 

abnormality


-Left foot x-ray showed no acute osseous abnormality


-CT of the head and cervical spine showed no acute intracranial process and no 

acute osseous abnormality of the cervical spine





Vitals: Blood pressure 168/62, heart rate 70, respiratory rate 16, SpO2 98% on 

room air


 


Patient admitted to internal medicine service


 


REVIEW OF SYSTEMS: 


CONSTITUTIONAL: No fever, no malaise, no fatigue. 


HEENT: No recent visual problems or hearing problems. Denied any sore throat. 


CARDIOVASCULAR: No chest pain, orthopnea, PND, no palpitations, no syncope. 


PULMONARY: No shortness of breath, no cough, no hemoptysis. 


GASTROINTESTINAL: No diarrhea, no nausea, no vomiting, no abdominal pain. 


NEUROLOGICAL: No headaches, no weakness, no numbness. 


HEMATOLOGICAL: Denies any bleeding or petechiae. 


GENITOURINARY: Denies any burning micturition, frequency, or urgency. 


MUSCULOSKELETAL/RHEUMATOLOGICAL: Denies any joint pain, swelling, or any muscle 

pain. 


ENDOCRINE: Denies any polyuria or polydipsia. 


 


The rest of the 14-point review of systems is negative.


 


PHYSICAL EXAMINATION: 


GENERAL: The patient is alert and oriented x3, with circumstantial speech


HEENT: Pupils are round and equally reacting to light. EOMI. No scleral icterus.

No conjunctival pallor.


CARDIOVASCULAR: S1 and S2 present. No murmurs, rubs, or gallops. 


PULMONARY: Chest is clear to auscultation, no wheezing or crackles. 


ABDOMEN: Soft, nontender, nondistended, normoactive bowel sounds. No palpable 

organomegaly.  Suprapubic tenderness to palpations.


MUSCULOSKELETAL: No joint swelling or deformity.


EXTREMITIES: No cyanosis, clubbing, or pedal edema. 


NEUROLOGICAL: Gross neurological examination did not reveal any focal deficits. 


SKIN: No rashes. 


 


Assessment and plan


69-year-old female with PMH of diabetes mellitus, thyroid disorder.  Presents 

emergency department following a fall at home in which she notes to having some 

dizziness prior to falling.





#Syncopy v Mechanical fall


#Debility


-Echocardiogram pending


-Orthostatics pending


-Cardiac monitoring


-On Aricept, monitor for bradycardic episodes - if occurring, consider D/C





#Complicated urinary tract infection


-WBC 13.6 with associated tenderness of the bladder


-Urinalysis positive with nitrites and leukocyte Estrace


-Urine culture pending


-Patient will continue with Rocephin 1 g IVPB every 24 hours


-Initiate NS 75 cc/h


-Monitor CBC, BMP





Chronic conditions:


#Dementia - continue home medications


#Hypothyroidism - Continue home medications





GI prophylaxis: Not indicated


DVT prophylaxis: Lovenox 40 mg subcu daily





Dictation was produced using dragon dictation software. please excuse any 

grammatical, word or spelling errors.





I saw and evaluated the patient during the key and critical portions of this 

encounter, and discussed the case in detail with the resident author of this 

note, I agree with the Assessment and Plan, and my changes, if any, are 

highlighted in blue.





Past Medical History


Past Medical History: Diabetes Mellitus, Eye Disorder, Thyroid Disorder


History of Any Multi-Drug Resistant Organisms: None Reported


Additional Past Surgical History / Comment(s): rt eye catartact removal


Past Anesthesia/Blood Transfusion Reactions: No Reported Reaction


Past Psychological History: No Psychological Hx Reported


Smoking Status: Never smoker





Medications and Allergies


                                Home Medications











 Medication  Instructions  Recorded  Confirmed  Type


 


ARIPiprazole [Abilify] 2 mg PO DAILY 01/27/25 01/27/25 History


 


Donepezil [Aricept] 10 mg PO HS 01/27/25 01/27/25 History


 


Ergocalciferol (Vitamin D2) 1,250 mcg PO Q7D 01/27/25 01/27/25 History





[Drisdol (50,000 Iu)]    


 


Levothyroxine Sodium [Synthroid] 25 mcg PO DAILY 01/27/25 01/27/25 History


 


Mirtazapine [Remeron] 15 mg PO HS 01/27/25 01/27/25 History


 


Triamcinolone 0.1% Ointment 1 applic TOPICAL TID 01/27/25 01/27/25 History





[Kenalog 0.1% Ointment]    


 


lisinopriL [Zestril] 20 mg PO DAILY 01/27/25 01/27/25 History


 


metFORMIN HCL [Glucophage] 500 mg PO BID 01/27/25 01/27/25 History


 


ondansetron HCL [Ondansetron HCl] 8 mg PO TID PRN 01/27/25 01/27/25 History








                                    Allergies











Allergy/AdvReac Type Severity Reaction Status Date / Time


 


No Known Allergies Allergy   Verified 01/27/25 16:16














Physical Exam


Osteopathic Statement: *.  No significant issues noted on an osteopathic 

structural exam other than those noted in the History and Physical/Consult.


Vitals: 


                                   Vital Signs











  Temp Pulse Resp BP Pulse Ox


 


 01/27/25 17:00   79  16  168/62  98


 


 01/27/25 15:25  98.2 F  78  16  106/64  96


 


 01/27/25 14:23   70  16  158/73  98


 


 01/27/25 12:19  99 F  70  16  188/66  98








                                Intake and Output











 01/27/25 01/27/25 01/27/25





 06:59 14:59 22:59


 


Other:   


 


  Weight  97.522 kg 














Results


CBC & Chem 7: 


                                 01/27/25 13:08





                                 01/27/25 13:08


Labs: 


                  Abnormal Lab Results - Last 24 Hours (Table)











  01/27/25 01/27/25 01/27/25 Range/Units





  13:08 13:08 13:27 


 


WBC  13.6 H    (3.8-10.6)  k/uL


 


Neutrophils #  11.1 H    (1.3-7.7)  k/uL


 


Potassium   5.4 H   (3.5-5.1)  mmol/L


 


Chloride   108 H   ()  mmol/L


 


BUN   25 H   (7-17)  mg/dL


 


Creatinine   1.25 H   (0.52-1.04)  mg/dL


 


Glucose   110 H   (74-99)  mg/dL


 


AST   46 H   (14-36)  U/L


 


Creatine Kinase   524 H   ()  U/L


 


Urine Appearance    Cloudy H  (Clear)  


 


Urine Protein    1+ H  (Negative)  


 


Urine Blood    Small H  (Negative)  


 


Urine Nitrite    Positive H  (Negative)  


 


Ur Leukocyte Esterase    Large H  (Negative)  


 


Urine RBC    8 H  (0-5)  /hpf


 


Urine WBC    132 H  (0-5)  /hpf


 


Urine WBC Clumps    Moderate H  (None)  /hpf


 


Amorphous Sediment    Rare H  (None)  /hpf


 


Urine Bacteria    Many H  (None)  /hpf


 


Urine Mucus    Occasional H  (None)  /hpf

## 2025-01-28 LAB
ALBUMIN SERPL-MCNC: 3.9 G/DL (ref 3.5–5)
ALP SERPL-CCNC: 86 U/L (ref 38–126)
ALT SERPL-CCNC: 11 U/L (ref 4–34)
ANION GAP SERPL CALC-SCNC: 11 MMOL/L
APTT BLD: 22.8 SEC (ref 22–30)
AST SERPL-CCNC: 44 U/L (ref 14–36)
BASOPHILS # BLD AUTO: 0 K/UL (ref 0–0.2)
BASOPHILS NFR BLD AUTO: 0 %
BUN SERPL-SCNC: 26 MG/DL (ref 7–17)
CALCIUM SPEC-MCNC: 9.7 MG/DL (ref 8.4–10.2)
CHLORIDE SERPL-SCNC: 105 MMOL/L (ref 98–107)
CO2 SERPL-SCNC: 25 MMOL/L (ref 22–30)
EOSINOPHIL # BLD AUTO: 0.2 K/UL (ref 0–0.7)
EOSINOPHIL NFR BLD AUTO: 2 %
ERYTHROCYTE [DISTWIDTH] IN BLOOD BY AUTOMATED COUNT: 4.54 M/UL (ref 3.8–5.4)
ERYTHROCYTE [DISTWIDTH] IN BLOOD: 13.7 % (ref 11.5–15.5)
GLUCOSE BLD-MCNC: 121 MG/DL (ref 70–110)
GLUCOSE SERPL-MCNC: 113 MG/DL (ref 74–99)
HCT VFR BLD AUTO: 40.5 % (ref 34–46)
HGB BLD-MCNC: 12.8 GM/DL (ref 11.4–16)
INR PPP: 1.1 (ref ?–1.2)
LYMPHOCYTES # SPEC AUTO: 2.2 K/UL (ref 1–4.8)
LYMPHOCYTES NFR SPEC AUTO: 19 %
MCH RBC QN AUTO: 28.3 PG (ref 25–35)
MCHC RBC AUTO-ENTMCNC: 31.7 G/DL (ref 31–37)
MCV RBC AUTO: 89.3 FL (ref 80–100)
MONOCYTES # BLD AUTO: 0.7 K/UL (ref 0–1)
MONOCYTES NFR BLD AUTO: 6 %
NEUTROPHILS # BLD AUTO: 8.1 K/UL (ref 1.3–7.7)
NEUTROPHILS NFR BLD AUTO: 72 %
PLATELET # BLD AUTO: 273 K/UL (ref 150–450)
POTASSIUM SERPL-SCNC: 4.3 MMOL/L (ref 3.5–5.1)
PROT SERPL-MCNC: 7.5 G/DL (ref 6.3–8.2)
PT BLD: 11.6 SEC (ref 10–12.5)
SODIUM SERPL-SCNC: 141 MMOL/L (ref 137–145)
WBC # BLD AUTO: 11.3 K/UL (ref 3.8–10.6)

## 2025-01-28 RX ADMIN — LEVOTHYROXINE SODIUM SCH MCG: 25 TABLET ORAL at 06:41

## 2025-01-28 RX ADMIN — ATORVASTATIN CALCIUM SCH MG: 40 TABLET, FILM COATED ORAL at 14:30

## 2025-01-28 RX ADMIN — ACETAMINOPHEN PRN MG: 325 TABLET, FILM COATED ORAL at 20:51

## 2025-01-28 RX ADMIN — SODIUM CHLORIDE, PRESERVATIVE FREE ONE ML: 5 INJECTION INTRAVENOUS at 12:26

## 2025-01-28 RX ADMIN — TENECTEPLASE STA MG: KIT at 12:24

## 2025-01-28 RX ADMIN — SODIUM CHLORIDE, PRESERVATIVE FREE ONE ML: 5 INJECTION INTRAVENOUS at 12:25

## 2025-01-28 NOTE — P.PN
Subjective


Progress Note Date: 01/28/25


History of present illness;


Patient is a 69-year-old female with a past medical history significant for syed

betes mellitus, thyroid disorder.  She presented to the emergency department 

after falling at home.  She states that she has frequent falls, with the most 

recent one being last night which brought her to the emergency department today.

 She states that she was in her apartment and attempting to clean her apartment,

at that time she states that she lost her footing and fell on her left side.  

She states that she did not hit her head, however she does attest to having felt

some dizziness prior to falling.  She was found on the ground by her son-in-law,

and she is unsure of how long she was on the ground for but believes it to be at

least 2 hours stating that she was too weak to get herself up.  She has no acute

pain anywhere on her body, however notes some generalized soreness in her lower 

extremities and her right shoulder.  She lives alone and states that she is able

to handle most activities of daily living by herself.  She states that she has 

not taken any anticoagulation medications.





1/28/25 - Patient seen and examined at bedside.  She had no events overnight, 

stated she was feeling better.  With no acute complaints.





Was contacted by the nurse at approximately 10:45 AM noting there was new onset 

weakness in the patient's right side, asking if this was new compared to yesterd

ay when the patient was previously seen.  Went down and saw the patient in the 

observation unit to assess, her right arm was in decerebrate positioning and 

noted she had an inability to move right lower extremity, drift when extending 

her right upper extremity.  Additionally, patient was noted to have left-sided 

facial droop, as well as left-sided deviation of the tongue when protruding.  

During this time she was able to fully converse and answer questions 

appropriately, although maintaining her circumstantial speech as well as 

confabulation.  At that time code stroke was called.





PHYSICAL EXAMINATION: 


GENERAL: The patient is alert and oriented x3, with circumstantial speech with 

confabulation


HEENT: Pupils are round and equally reacting to light. EOMI. No scleral icterus.

No conjunctival pallor.


CARDIOVASCULAR: S1 and S2 present. No murmurs, rubs, or gallops. 


PULMONARY: Chest is clear to auscultation, no wheezing or crackles. 


ABDOMEN: Soft, nontender, nondistended, normoactive bowel sounds. No palpable 

organomegaly.  Suprapubic tenderness to palpations.


MUSCULOSKELETAL: No joint swelling or deformity.


EXTREMITIES: No cyanosis, clubbing, or pedal edema. 


NEUROLOGICAL: Inability to move right lower extremity; drift with extension of 

right upper extremity; right upper extremity noted to be into 3 repositioning; 

decreased  strength on the right side; left sided facial droop and tongue 

deviation 


SKIN: No rashes.





New data today:


Labs: WBC 11.3, hemoglobin 12.8, hct 40.5, platelet 273; sodium 141, potassium 

4.3, BUN 26, creatinine 1.64


Imaging:


-Brain CT showed similar mild ventriculomegaly likely due to central cerebral 

atrophy; mild to moderate patchy report of chronic small vessel ischemic disease


-CT angiography head/neck showed atherosclerotic change of the bilateral carotid

bifurcations resulting in moderate, 65% proximal right ICA stenosis on the right

and moderate, just over 50% proximal left ICA stenosis on the left - no 

hemodynamically significant ICA stenosis identified; no large vessel 

intracranial arterial occlusion, significant stenosis, or aneurysmal change seen


-Carotid Doppler study currently pending read by radiology





Assessment and plan


69-year-old female with PMH of diabetes mellitus, thyroid disorder.  Presents 

emergency department following a fall at home in which she notes to having some 

dizziness prior to falling.





#New onset right-sided weakness


#History of previous stroke ~30 years ago


-Last known normal ~7:30-8:00 am


-~10:45 a.m. patient was noted to have weakness of her right lower extremity


-Code stroke called


-CT brain completed, read and reviewed


-CT angiography head/neck read and reviewed


-Carotid Doppler study currently pending read by radiology


-MRI brain w/o contrast ordered, pending


-Patient received TNK 24.5 mg IV push once


-Initiated aspirin 325 mg once this morning, will continue with 81 mg aspirin 

daily starting tomorrow


-Initiated 40 mg Lipitor daily, with first dose being given this morning


-Lipid panel ordered


-PT, OT, ST on consult


-Await further evaluation and recommendation from neurology


-Patient transferred to the ICU for closer observation and management





#Syncope v Mechanical fall


#Debility


-Echocardiogram pending


-Orthostatics pending


-Cardiac monitoring


-On Aricept, monitor for bradycardic episodes - if occurring, consider D/C





#Complicated urinary tract infection


-WBC 13.6 with associated tenderness of the bladder


-Urinalysis positive with nitrites and leukocyte Estrace


-Urine culture pending


-Patient will continue with Rocephin 1 g IVPB every 24 hours


-Continue on NS 75 cc/h


-Monitor CBC, BMP





Chronic conditions:


#Dementia - continue home medications


#Hypothyroidism - Continue home medications





GI prophylaxis: Not indicated


DVT prophylaxis: Lovenox 40 mg subcu daily





Dictation was produced using dragon dictation software. please excuse any 

grammatical, word or spelling errors. 





I saw and evaluated the patient during the key and critical portions of this 

encounter, and discussed the case in detail with the resident author of this 

note, I agree with the Assessment and Plan, and my changes, if any, are 

highlighted in blue.





Objective





- Vital Signs


Vital signs: 


                                   Vital Signs











Temp  98.1 F   01/28/25 06:00


 


Pulse  74   01/28/25 06:00


 


Resp  16   01/28/25 06:00


 


BP  178/86   01/28/25 06:00


 


Pulse Ox  98   01/28/25 06:00


 


FiO2      








                                 Intake & Output











 01/27/25 01/27/25 01/28/25





 06:59 18:59 06:59


 


Weight  97.522 kg 














- Labs


CBC & Chem 7: 


                                 01/28/25 10:16





                                 01/28/25 10:16


Labs: 


                  Abnormal Lab Results - Last 24 Hours (Table)











  01/27/25 01/27/25 01/27/25 Range/Units





  13:08 13:08 13:27 


 


WBC  13.6 H    (3.8-10.6)  k/uL


 


Neutrophils #  11.1 H    (1.3-7.7)  k/uL


 


Potassium   5.4 H   (3.5-5.1)  mmol/L


 


Chloride   108 H   ()  mmol/L


 


BUN   25 H   (7-17)  mg/dL


 


Creatinine   1.25 H   (0.52-1.04)  mg/dL


 


Glucose   110 H   (74-99)  mg/dL


 


AST   46 H   (14-36)  U/L


 


Creatine Kinase   524 H   ()  U/L


 


Urine Appearance    Cloudy H  (Clear)  


 


Urine Protein    1+ H  (Negative)  


 


Urine Blood    Small H  (Negative)  


 


Urine Nitrite    Positive H  (Negative)  


 


Ur Leukocyte Esterase    Large H  (Negative)  


 


Urine RBC    8 H  (0-5)  /hpf


 


Urine WBC    132 H  (0-5)  /hpf


 


Urine WBC Clumps    Moderate H  (None)  /hpf


 


Amorphous Sediment    Rare H  (None)  /hpf


 


Urine Bacteria    Many H  (None)  /hpf


 


Urine Mucus    Occasional H  (None)  /hpf

## 2025-01-28 NOTE — CT
EXAMINATION TYPE: CT brain wo con for TPA

 

DATE OF EXAM: 1/28/2025 11:11 AM

 

COMPARISON: 1/27/2025 

 

CLINICAL INDICATION: Female, 69 years old with history of Headache, rt side weakness, 

 

TECHNIQUE:  Examination was done in axial plane without intravenous contrast.  Coronal and sagittal r
econstructions performed.

CT DLP: 1100.1 mGycm, Automated exposure control for dose reduction was used.

 

FINDINGS:

There is no evidence of  acute intracranial hemorrhage, acute ischemic changes, mass, mass-effect, or
 extra-axial fluid collection.  There is no effacement of cerebral sulci or basal subarachnoid cister
ns. 

 

There is unchanged mild ventricular prominence, interstitial calculated at 0.37. Some scattered mild 
to moderate patchy white matter hypodensities are unchanged. 

 

Benign basal ganglionic calcifications. There is no midline shift.  Gray-white matter distinction is 
preserved.

 

Atherosclerotic calcifications in the carotid siphons.

 

8 mm mucosal retention cyst or polyp along the anterior floor of the left maxillary sinus. Slight rig
htward nasal septal deviation. Mastoid air cells are well pneumatized. The globes are intact.

 

 

IMPRESSION:

 

1. Similar mild ventriculomegaly likely due to central cerebral atrophy. Correlate with symptoms to e
xclude a component of NPH.

2. Mild-to-moderate patchy border of chronic small vessel ischemic disease. No acute intracranial abn
ormality seen.

3. If symptoms persist, consider MRI.

 

[facility], Workstation: [email or web address], 1/28/2025 11:17 AM

## 2025-01-28 NOTE — P.CNNES
History of Present Illness


Consult date: 01/28/25


Requesting physician: Ottoniel Collier


Reason for Consult: new onset right sided weakness and left facial droop


History of Present Illness: 


This is a 69-year-old woman who presented emergency department on 01/27/2025 

because of fall.  Some of the history is obtained from the primary team resident

as well as medical record.  It seems that the patient had a fall the night prior

to present to the hospital.  It seems the patient has frequent falls.  Seems 

patient has probable underlying acute urinary tract infection.  Today she was 

evaluated by physical therapy around 10-izzy a.m. and noted she had right sided 

weakness.  The resident from the primary team evaluated the patient around 8-izzy

a.m. and he noted that she was moving all extremities and he did not noticed any

focal deficit.  As a result a code stroke was activated.  Per this hospital 

visit the patient was receiving aspirin.





Some of the workup during this hospital visit consisted of:


Patient POC glucose is in the 120s


Creatinine is 1.64 which is trended up from yesterday it was 1.25.  BUN is 26.  

Sodium is 141.  Calcium and ALT is within normal limits.  AST is 44.


CT of the head is reported as similar mild ventriculomegaly likely due to 

central cerebral atrophy.  Correlate with symptoms to exclude component of NPH. 

Mild to moderate patchy border of chronic small vessel ischemic disease.  No 

acute intracranial abnormality seen.  I personally reviewed the CT and agree 

there is no acute or subacute stroke.


CTA neck: Atherosclerotic changes of bilateral carotid bifurcation this results 

moderate 65% proximal right ICA stenosis on the right and moderate just over 50%

proximal left ICA stenosis on the left.  No hemodynamically significant ICA marcie

nosis identified.  Anatomic variation with takeoff of the left vertebral artery 

directly from the aortic arch.


CTA head: No large vessel intracranial arterial occlusion, significant stenosis 

or aneurysm changes seen


NIH stroke scale was 7 initially with right sided weakness (lower > upper) and 

left facial droop.  On second NIH was 5.


Dr. Carl (tele-stroke attending) was contacted about the case and he stated

to pursue with IV thrombolytics.





The nurse asked me to evaluate patient.  The patient, she stated that her 

symptoms began about 2 days ago.  I reach out to resident from the primary team 

and he stated that patient was moving her extremities normally yesterday and 

tomorrow or early in the morning and he did notice upon reevaluating her in the 

morning that she did have deficits but when he evaluated her around 8-izzy a.m. 

she did not have any focal deficit.  He stated that the patient has underlying 

history of dementia and she confabulates.  I asked the nurse to obtain vitals wh

ich the blood pressure was within TNK window.  


The nurse and myself reached out to the legal guardian and I spoke with them 

personally and notified about the situation and notified them about benefit vs 

the risk and they decided to pursue with IV TNK.


The IV TNK took a prolonged period of time since was unknown exactly when was 

last normal and exact time frame.











Review of Systems


Limited but as per HPI.








Past Medical History


Past Medical History: Dementia, Diabetes Mellitus, Eye Disorder, Hypertension, 

Thyroid Disorder


Additional Past Medical History / Comment(s): blood in urine noticed by daughter

1 year 2024, but never followed up w/dr.


History of Any Multi-Drug Resistant Organisms: None Reported


Additional Past Surgical History / Comment(s): rt eye catartact removal and 

implant


Past Anesthesia/Blood Transfusion Reactions: No Reported Reaction


Past Psychological History: No Psychological Hx Reported


Smoking Status: Never smoker


Past Alcohol Use History: None Reported


Past Drug Use History: None Reported, Marijuana





Medications and Allergies


                                Home Medications











 Medication  Instructions  Recorded  Confirmed  Type


 


ARIPiprazole [Abilify] 2 mg PO DAILY 01/27/25 01/27/25 History


 


Donepezil [Aricept] 10 mg PO HS 01/27/25 01/27/25 History


 


Ergocalciferol (Vitamin D2) 1,250 mcg PO Q7D 01/27/25 01/27/25 History





[Drisdol (50,000 Iu)]    


 


Levothyroxine Sodium [Synthroid] 25 mcg PO DAILY 01/27/25 01/27/25 History


 


Mirtazapine [Remeron] 15 mg PO HS 01/27/25 01/27/25 History


 


Triamcinolone 0.1% Ointment 1 applic TOPICAL TID 01/27/25 01/27/25 History





[Kenalog 0.1% Ointment]    


 


lisinopriL [Zestril] 20 mg PO DAILY 01/27/25 01/27/25 History


 


metFORMIN HCL [Glucophage] 500 mg PO BID 01/27/25 01/27/25 History


 


ondansetron HCL [Ondansetron HCl] 8 mg PO TID PRN 01/27/25 01/27/25 History








                                    Allergies











Allergy/AdvReac Type Severity Reaction Status Date / Time


 


No Known Allergies Allergy   Verified 01/27/25 16:16














Physical Examination





- Vital Signs


Vital Signs: 


                                   Vital Signs











  Temp Pulse Resp BP Pulse Ox


 


 01/28/25 13:40   85  14  177/75  95


 


 01/28/25 13:25   86  12  175/59  96


 


 01/28/25 13:10   92  16  163/60  96


 


 01/28/25 12:55   89  19  163/71  93 L


 


 01/28/25 12:40   78  11 L  181/67  96


 


 01/28/25 12:25  98 F  80  21  172/70  97


 


 01/28/25 12:15  98 F  72  16  172/70  95


 


 01/28/25 06:00  98.1 F  74  16  178/86  98


 


 01/28/25 03:21   75  12  127/59  98


 


 01/27/25 19:28  98.0 F  80  22  149/65  97


 


 01/27/25 18:00   77  16  161/70  98


 


 01/27/25 17:00   79  16  168/62  98


 


 01/27/25 15:25  98.2 F  78  16  106/64  96


 


 01/27/25 14:23   70  16  158/73  98








                                Intake and Output











 01/27/25 01/28/25 01/28/25





 22:59 06:59 14:59


 


Intake Total   150


 


Output Total   150


 


Balance   0


 


Intake:   


 


  IV   150


 


    Sodium Chloride 0.9% 1,   150





    000 ml @ 75 mls/hr IV .   





    [id number] [facility] Rx#:[id number]   


 


Output:   


 


  Urine   150


 


Other:   


 


  Voiding Method   Bedpan





   External Catheter


 


  Weight   97.522 kg











General: Lying in bed and is not in acute distress.


HENT: Supple neck.


Neuro:


Limited since patient has underlying dementia.


Patient is awake alert oriented to self.  She is following simple commands.  No 

aphasia from limited language


The pupils are round about 4 mm and reactive to light.  Visual fields are full 

to confrontation.  Extraocular movements intact no nystagmus.  Normal facial 

sensation to touch.  Patient does have mild left lower facial droop.  No 

dysarthria


Tongue is midline moves side-to-side with any difficulty


Motor: Right upper extremity right arm extension patient has about a 3 right 

hand  is 3-4.  Patient does have her right upper extremity drift.  Left arm 

flexion is about a 4 -.  Right lower extremity is 0-1.  Left upper lower 

extremity is 5 out of 5.


Sensation: She could not tell me if it feels normal to touch but it seems that 

she had normal painful stimuli bilaterally.


Reflexes 2 positive throughout except for the left lower extremity is a 1 

positive.


Plantars are mute.








Results





- Laboratory Findings


CBC and BMP: 


                                 01/28/25 10:16





                                 01/28/25 10:16


Abnormal Lab Findings: 


                                  Abnormal Labs











  01/27/25 01/27/25 01/27/25





  13:08 13:08 13:27


 


WBC  13.6 H  


 


Neutrophils #  11.1 H  


 


Potassium   5.4 H 


 


Chloride   108 H 


 


BUN   25 H 


 


Creatinine   1.25 H 


 


Glucose   110 H 


 


POC Glucose (mg/dL)   


 


AST   46 H 


 


Creatine Kinase   524 H 


 


Urine Appearance    Cloudy H


 


Urine Protein    1+ H


 


Urine Blood    Small H


 


Urine Nitrite    Positive H


 


Ur Leukocyte Esterase    Large H


 


Urine RBC    8 H


 


Urine WBC    132 H


 


Urine WBC Clumps    Moderate H


 


Amorphous Sediment    Rare H


 


Urine Bacteria    Many H


 


Urine Mucus    Occasional H














  01/28/25 01/28/25 01/28/25





  10:16 10:16 10:56


 


WBC  11.3 H  


 


Neutrophils #  8.1 H  


 


Potassium   


 


Chloride   


 


BUN   26 H 


 


Creatinine   1.64 H 


 


Glucose   113 H 


 


POC Glucose (mg/dL)    121 H


 


AST   44 H 


 


Creatine Kinase   


 


Urine Appearance   


 


Urine Protein   


 


Urine Blood   


 


Urine Nitrite   


 


Ur Leukocyte Esterase   


 


Urine RBC   


 


Urine WBC   


 


Urine WBC Clumps   


 


Amorphous Sediment   


 


Urine Bacteria   


 


Urine Mucus   














Assessment and Plan


Assessment: 


This is a 69-year-old woman present emergency department on 01/27/2025 because 

of a fall the night prior.  Seems the patient has recurrent falls.  This hospi

[place] visit seems to the patient has probable acute urinary tract infection.  

Today it was noted the patient had right sided weakness and left facial droop 

upon being evaluated by physical therapy around 10-izzy a.m. and last normal 

reported around 8-izzy a.m. by resident from primary team.  





Acute right sided weakness as well as left facial droop is likely acute ischemic

stroke status post IV TNK.  Her initial NIH stroke scale was a 7 and the repeat 

was a 5


Right ICA stenosis of about 65 and left ICA of 50%.  CT angiography


Likely acute urinary tract infection


Recurrent falls


Underlying history of dementia and per the resident from primary team he states 

the patient confabulates at baseline


Hypothyroidism


Diabetes mellitus





 


Plan: 


I ordered MRI of the brain lipid panel


Will get repeat CT head tomorrow post 24 hours IV thrombolytics.


2D echo was ordered and is pending


I stopped the patient aspirin since the patient is getting IV TNK.  


Continue Lipitor 40 mg daily.


Recommend the systolic blood pressure to be less than 180 and diastolic less 

than 105 per IV TNK protocol.  Use IV labetalol as needed if needed and will 

defer the management to the primary/ICU team


Continue neurochecks per IV TNK protocol


Cardiac monitoring


PT OT and SLP are consulted


I consulted vascular surgery team for the carotid stenosis


Will defer the rest of the medical management to primary and other specialist


For DVT prophylaxis use SCDs





Plan discussed with the legal guardian, the primary team and her nurse


Thank you for the consultation.


Total care for the patient was a 50-minutes





Time with Patient: Greater than 30

## 2025-01-28 NOTE — P.GSCN
History of Present Illness


Consult date: 01/28/25


Reason for Consult: 





Carotid stenosis, code stroke


Requesting physician: Mason Tolbert


History of present illness: 





This a pleasant 69-year-old female with a history of dementia, diabetes mellitus

and hypothyroidism who was brought in by EMS after she was found on the floor of

her home.  Apparently patient had fallen at home she is on sure why she fell if 

she tripped or if she was just weak.  Apparently she laid on the ground for at 

least a couple hours if not more and could not get up due to weakness.  Patient 

was admitted for weakness with fall and also was found to be positive for 

urinary tract infection.  She does have a legal guardian secondary to her 

dementia.  She was admitted to observation and  mostly reportedly physical 

therapy was working with the patient and she had left-sided weakness.  They 

called a code stroke.  She had a CT of the brain with chronic ischemic changes, 

no acute findings and a CTA of the head and neck with reported 65% right ICA 

stenosis and greater than 50% of the left ICA.  Patient was transferred to the 

ICU and started on T. enecteplase.  Vascular surgery was consulted for carotid 

stenosis.  She denies any previous history of carotid stenosis, states she does 

have a history of a stroke about 30 years ago when she lived in [place].  She 

states that she has been falling recently she states is more so that she 

secondary to not watching where she is going or when she is going up or down 

steps.  States that she has had some visual changes but she is due for eye exam 

and does not wear her glasses.  She is currently denying any focal deficits at 

this time, other than right sided weakness.





Review of Systems





A 14 point review systems was completed all pertinent positives and negatives as

stated in the HPI.





Past Medical History


Past Medical History: Diabetes Mellitus, Eye Disorder, Thyroid Disorder


History of Any Multi-Drug Resistant Organisms: None Reported


Additional Past Surgical History / Comment(s): rt eye catartact removal


Past Anesthesia/Blood Transfusion Reactions: No Reported Reaction


Smoking Status: Never smoker





Medications and Allergies


                                Home Medications











 Medication  Instructions  Recorded  Confirmed  Type


 


ARIPiprazole [Abilify] 2 mg PO DAILY 01/27/25 01/27/25 History


 


Donepezil [Aricept] 10 mg PO HS 01/27/25 01/27/25 History


 


Ergocalciferol (Vitamin D2) 1,250 mcg PO Q7D 01/27/25 01/27/25 History





[Drisdol (50,000 Iu)]    


 


Levothyroxine Sodium [Synthroid] 25 mcg PO DAILY 01/27/25 01/27/25 History


 


Mirtazapine [Remeron] 15 mg PO HS 01/27/25 01/27/25 History


 


Triamcinolone 0.1% Ointment 1 applic TOPICAL TID 01/27/25 01/27/25 History





[Kenalog 0.1% Ointment]    


 


lisinopriL [Zestril] 20 mg PO DAILY 01/27/25 01/27/25 History


 


metFORMIN HCL [Glucophage] 500 mg PO BID 01/27/25 01/27/25 History


 


ondansetron HCL [Ondansetron HCl] 8 mg PO TID PRN 01/27/25 01/27/25 History








                                    Allergies











Allergy/AdvReac Type Severity Reaction Status Date / Time


 


No Known Allergies Allergy   Verified 01/27/25 16:16














Surgical - Exam


                                   Vital Signs











Temp Pulse Resp BP Pulse Ox


 


 99 F   70   16   188/66   98 


 


 01/27/25 12:19  01/27/25 12:19  01/27/25 12:19  01/27/25 12:19  01/27/25 12:19














General appearance: The patient is alert, oriented, appears in no acute distr

ess.  Obese.


HET: Head is normocephalic and atraumatic.  Pupils are equal and reactive.  


Neck: Supple.  No audible bruit.


Heart: Regular.


Lungs: Equal expansion, normal respiratory effort.


Abdomen: Soft, nontender, nondistended.


Extremities: Normal skin color and turgor.  Palpable bilateral radial and DP 

pulses.


Neurological: Patient is alert and oriented.  Speech is fluent, answers 

questions appropriately.  Right sided upper and lower extremity weakness.  

Appears to have a left facial droop with smile. 





Results





- Labs





                                 01/28/25 10:16





                                 01/28/25 10:16


                  Abnormal Lab Results - Last 24 Hours (Table)











  01/27/25 01/27/25 01/27/25 Range/Units





  13:08 13:08 13:27 


 


WBC  13.6 H    (3.8-10.6)  k/uL


 


Neutrophils #  11.1 H    (1.3-7.7)  k/uL


 


Potassium   5.4 H   (3.5-5.1)  mmol/L


 


Chloride   108 H   ()  mmol/L


 


BUN   25 H   (7-17)  mg/dL


 


Creatinine   1.25 H   (0.52-1.04)  mg/dL


 


Glucose   110 H   (74-99)  mg/dL


 


POC Glucose (mg/dL)     ()  mg/dL


 


AST   46 H   (14-36)  U/L


 


Creatine Kinase   524 H   ()  U/L


 


Urine Appearance    Cloudy H  (Clear)  


 


Urine Protein    1+ H  (Negative)  


 


Urine Blood    Small H  (Negative)  


 


Urine Nitrite    Positive H  (Negative)  


 


Ur Leukocyte Esterase    Large H  (Negative)  


 


Urine RBC    8 H  (0-5)  /hpf


 


Urine WBC    132 H  (0-5)  /hpf


 


Urine WBC Clumps    Moderate H  (None)  /hpf


 


Amorphous Sediment    Rare H  (None)  /hpf


 


Urine Bacteria    Many H  (None)  /hpf


 


Urine Mucus    Occasional H  (None)  /hpf














  01/28/25 01/28/25 01/28/25 Range/Units





  10:16 10:16 10:56 


 


WBC  11.3 H    (3.8-10.6)  k/uL


 


Neutrophils #  8.1 H    (1.3-7.7)  k/uL


 


Potassium     (3.5-5.1)  mmol/L


 


Chloride     ()  mmol/L


 


BUN   26 H   (7-17)  mg/dL


 


Creatinine   1.64 H   (0.52-1.04)  mg/dL


 


Glucose   113 H   (74-99)  mg/dL


 


POC Glucose (mg/dL)    121 H  ()  mg/dL


 


AST   44 H   (14-36)  U/L


 


Creatine Kinase     ()  U/L


 


Urine Appearance     (Clear)  


 


Urine Protein     (Negative)  


 


Urine Blood     (Negative)  


 


Urine Nitrite     (Negative)  


 


Ur Leukocyte Esterase     (Negative)  


 


Urine RBC     (0-5)  /hpf


 


Urine WBC     (0-5)  /hpf


 


Urine WBC Clumps     (None)  /hpf


 


Amorphous Sediment     (None)  /hpf


 


Urine Bacteria     (None)  /hpf


 


Urine Mucus     (None)  /hpf








                                 Diabetes panel











  01/27/25 01/28/25 Range/Units





  13:08 10:16 


 


Sodium  141  141  (137-145)  mmol/L


 


Potassium  5.4 H  4.3  (3.5-5.1)  mmol/L


 


Chloride  108 H  105  ()  mmol/L


 


Carbon Dioxide  22  25  (22-30)  mmol/L


 


BUN  25 H  26 H  (7-17)  mg/dL


 


Creatinine  1.25 H  1.64 H  (0.52-1.04)  mg/dL


 


Glucose  110 H  113 H  (74-99)  mg/dL


 


Calcium  9.9  9.7  (8.4-10.2)  mg/dL


 


AST  46 H  44 H  (14-36)  U/L


 


ALT  11  11  (4-34)  U/L


 


Alkaline Phosphatase  74  86  ()  U/L


 


Total Protein  8.1  7.5  (6.3-8.2)  g/dL


 


Albumin  4.2  3.9  (3.5-5.0)  g/dL








                                  Calcium panel











  01/27/25 01/28/25 Range/Units





  13:08 10:16 


 


Calcium  9.9  9.7  (8.4-10.2)  mg/dL


 


Albumin  4.2  3.9  (3.5-5.0)  g/dL








                                 Pituitary panel











  01/27/25 01/28/25 Range/Units





  13:08 10:16 


 


Sodium  141  141  (137-145)  mmol/L


 


Potassium  5.4 H  4.3  (3.5-5.1)  mmol/L


 


Chloride  108 H  105  ()  mmol/L


 


Carbon Dioxide  22  25  (22-30)  mmol/L


 


BUN  25 H  26 H  (7-17)  mg/dL


 


Creatinine  1.25 H  1.64 H  (0.52-1.04)  mg/dL


 


Glucose  110 H  113 H  (74-99)  mg/dL


 


Calcium  9.9  9.7  (8.4-10.2)  mg/dL








                                  Adrenal panel











  01/27/25 01/28/25 Range/Units





  13:08 10:16 


 


Sodium  141  141  (137-145)  mmol/L


 


Potassium  5.4 H  4.3  (3.5-5.1)  mmol/L


 


Chloride  108 H  105  ()  mmol/L


 


Carbon Dioxide  22  25  (22-30)  mmol/L


 


BUN  25 H  26 H  (7-17)  mg/dL


 


Creatinine  1.25 H  1.64 H  (0.52-1.04)  mg/dL


 


Glucose  110 H  113 H  (74-99)  mg/dL


 


Calcium  9.9  9.7  (8.4-10.2)  mg/dL


 


Total Bilirubin  1.2  1.0  (0.2-1.3)  mg/dL


 


AST  46 H  44 H  (14-36)  U/L


 


ALT  11  11  (4-34)  U/L


 


Alkaline Phosphatase  74  86  ()  U/L


 


Total Protein  8.1  7.5  (6.3-8.2)  g/dL


 


Albumin  4.2  3.9  (3.5-5.0)  g/dL














- Imaging


Comments: 





CT angiogram head and neck


Neck reports arthrosclerotic change of the bilateral carotid bifurcations.  This

results in moderate 65% proximal right ICA stenosis on the right and moderate, 

just over 50% proximal left ICA stenosis on the left.  No hemodynamically 

significant ICA stenosis identified.  Anatomic variation with takeoff of the 

left vertebral artery directly from the aortic arch.  No large vessel 

intracranial arterial occlusion, significant stenosis, or aneurysmal change is 

seen.





Brain CT


Similar mild ventriculomegaly likely due to central cerebral atrophy.  Correlate

with symptoms to exclude a component of NP H.  Mild to moderate patchy border of

chronic small vessel ischemic disease.  No acute intracranial abnormality seen.





Assessment and Plan


Assessment: 





1.  Right ICA stenosis 65%, left greater than 50% per CTA 


2.  Right sided weakness, code stroke called


3.  Recent fall


4.  Urinary tract infection


5.  Hypertension


6.  Dementia


Plan: 





1.  Will order carotid duplex


2.  Await further evaluation and recommendations from neurology


3.  Patient is status post T.enecteplase


4.  PT, OT, ST on consult


5.  Further recommendations forthcoming based on clinical course


Thank you for this consultation, we will follow along.








The impression and plan of care has been dictated as directed.








I performed a history and examination of this patient,  discussed the same with 

the dictator.  I agree with the dictator's note ,documented as a scribe.  Any 

additional findings or plans will be noted.

## 2025-01-28 NOTE — CT
EXAMINATION TYPE: CODE STROKE: CTA head neck

 

DATE OF EXAM: 1/28/2025 11:39 AM

 

COMPARISON: CT brain same day 

 

CLINICAL INDICATION: Female, 69 years old with history of new right sided weakness, rt side weakness,
 

 

TECHNIQUE: Contiguous axial scanning of the head and neck performed with IV Contrast, patient injecte
d with 65ml mL of Isovue 370. Coronal and sagittal reconstructions performed. 3-D reconstructions gen
erated on a dedicated independent workstation.

 

CT DLP: 708.4 mGycm, Automated exposure control for dose reduction was used.

 

FINDINGS: 

 

Neck:

There is very direct takeoff of the left vertebral artery directly from the aortic arch. Slightly dom
inant right vertebral artery. Otherwise, both vertebral arteries are patent throughout the course.

 

Right common carotid artery is patent.

Atherosclerotic calcifications at the proximal right carotid bulb contributes to a moderate, 65% prox
imal ICA stenosis. Remainder of the right ICA is patent.

 

Left common carotid artery is patent. Atherosclerotic changes at the left carotid bifurcation contrib
utes to a moderate, just over 50% stenosis of the carotid bulb. The remainder of the left ICA is denney
nt.

 

NASCET criteria is utilized.

 

 

Brain:

The V4 segment left vertebral artery becomes slightly more hypoplastic. Otherwise, both vertebral and
 basilar arteries are patent as is the remainder of the posterior circulation.

 

Dural venous sinuses are patent.

 

Atherosclerotic calcifications in the bilateral carotid siphons. Internal carotid arteries are patent
 as is the remainder of the anterior circulation.

 

No significant stenosis or aneurysmal change is identified.

 

 

IMPRESSION: 

NECK:

1. ATHEROSCLEROTIC CHANGE OF THE BILATERAL CAROTID BIFURCATIONS. THIS RESULTS IN MODERATE, 65% PROXIM
AL RIGHT ICA STENOSIS ON THE RIGHT AND MODERATE, JUST OVER 50% PROXIMAL LEFT ICA STENOSIS ON THE LEFT
. NO HEMODYNAMICALLY SIGNIFICANT ICA STENOSIS IDENTIFIED.

2. ANATOMIC VARIATION WITH TAKEOFF OF THE LEFT VERTEBRAL ARTERY DIRECTLY FROM THE AORTIC ARCH.

 

HEAD:

3. NO LARGE VESSEL INTRACRANIAL ARTERIAL OCCLUSION, SIGNIFICANT STENOSIS, OR ANEURYSMAL CHANGE IS SEE
N.

 

 

 

[facility], Workstation: [id number], 1/28/2025 11:42 AM

## 2025-01-28 NOTE — P.CNPUL
History of Present Illness


Consult date: 01/28/25


Chief complaint: Acute CVA, right-sided weakness


History of present illness: 





This is a 69-year-old female patient was brought into the intensive.


Receiving thrombolytics for new onset right-sided weakness and left facial 

droop.  The patient is not have dementia.  She is known to have diabetes 

mellitus with chronic kidney disease.  She is also known to have hypertension 

hyperlipidemia.  She has been having episodes of falls and earlier this morning,

the patient was noted to have right-sided weakness.  CT scan of the head showed 

mild ventriculomegaly and cerebral atrophy.  Chronic small vessel ischemic 

changes.  CTA of the neck showed 65% proximal right ICA and 50% proximal left 

ICA and the carotid Doppler showed no significant hemodynamically stenosis of 

the carotids.  CTA of the head and neck showed no large vessel intracranial 

arterial occlusion.  Initial NIH score was 7.  The patient was given TNK and 

following that she was admitted to the intensive care unit.  Motor function in 

the right upper extremity is adequate.  There is some ongoing weakness in the 

right lower extremity.  She is awake alert and communicating.  Family at the 

bedside.  No vision changes.  No headaches.  No seizure activity.  She has been 

found to have UTI and currently the patient is on IV Rocephin.  Afebrile and 

hemodynamically stable.  Normal coagulation profile.  Creatinine is at 1.4 

consistent with chronic kidney disease with a GFR of 32.  Hemoglobin is at 12.8.





Review of Systems


Constitutional: Reports daytime sleepiness, Reports fatigue, Reports weight gain


Eyes: denies as per HPI, denies blurred vision, denies bulging eye, denies 

decreased vision, denies diplopia, denies discharge, denies dry eye, denies 

irritation, denies itching, denies pain, denies photophobia, denies loss of 

peripheral vision, denies loss of vision, denies tunnel vision/blind spots


Ears: deny: decreased hearing, ear discharge, earache, tinnitus


Ears, nose, mouth and throat: Reports as per HPI


Breasts: absent: as per HPI, change in shape, gynecomastia, masses, nipple 

discharge, pain, skin changes, swelling


Breasts: Reports as per HPI


Cardiovascular: Reports as per HPI


Respiratory: Reports as per HPI


Gastrointestinal: Reports as per HPI


Genitourinary: Reports as per HPI


Menstruation: Reports as per HPI


Musculoskeletal: absent: ankle pain, ankle stiffness, ankle swelling, as per 

HPI, elbow pain, elbow stiffness, elbow swelling, foot pain, foot stiffness, 

foot swelling, hand pain, hand stiffness, hand swelling, hip pain, hip 

stiffness, hip swelling, knee pain, knee stiffness, knee swelling, shoulder 

pain, shoulder stiffness, shoulder swelling, wrist pain, wrist stiffness, wrist 

swelling


Integumentary: Reports as per HPI


Neurological: Reports gait dysfunction, Reports memory loss, Reports weakness


Psychiatric: Reports as per HPI


Hematologic/Lymphatic: Reports as per HPI


Allergic/Immunologic: Reports as per HPI





Past Medical History


Past Medical History: Dementia, Diabetes Mellitus, Eye Disorder, Hypertension, 

Renal Disease, Thyroid Disorder


Additional Past Medical History / Comment(s): blood in urine noticed by daughter

1 year 2024, but never followed up w/dr.


History of Any Multi-Drug Resistant Organisms: None Reported


Additional Past Surgical History / Comment(s): rt eye catartact removal and 

implant


Past Anesthesia/Blood Transfusion Reactions: No Reported Reaction


Past Psychological History: No Psychological Hx Reported


Smoking Status: Never smoker


Past Alcohol Use History: None Reported


Past Drug Use History: None Reported, Marijuana





Medications and Allergies


                                Home Medications











 Medication  Instructions  Recorded  Confirmed  Type


 


ARIPiprazole [Abilify] 2 mg PO DAILY 01/27/25 01/27/25 History


 


Donepezil [Aricept] 10 mg PO HS 01/27/25 01/27/25 History


 


Ergocalciferol (Vitamin D2) 1,250 mcg PO Q7D 01/27/25 01/27/25 History





[Drisdol (50,000 Iu)]    


 


Levothyroxine Sodium [Synthroid] 25 mcg PO DAILY 01/27/25 01/27/25 History


 


Mirtazapine [Remeron] 15 mg PO HS 01/27/25 01/27/25 History


 


Triamcinolone 0.1% Ointment 1 applic TOPICAL TID 01/27/25 01/27/25 History





[Kenalog 0.1% Ointment]    


 


lisinopriL [Zestril] 20 mg PO DAILY 01/27/25 01/27/25 History


 


metFORMIN HCL [Glucophage] 500 mg PO BID 01/27/25 01/27/25 History


 


ondansetron HCL [Ondansetron HCl] 8 mg PO TID PRN 01/27/25 01/27/25 History








                                    Allergies











Allergy/AdvReac Type Severity Reaction Status Date / Time


 


No Known Allergies Allergy   Verified 01/27/25 16:16














Physical Exam


Vitals: 


                                   Vital Signs











  Temp Pulse Resp BP Pulse Ox


 


 01/28/25 18:55   72  23  177/84  95


 


 01/28/25 18:25   88  23  162/87  95


 


 01/28/25 17:55   71  24  155/65  95


 


 01/28/25 17:25   78  14  158/70  95


 


 01/28/25 16:25   69  30 H  156/75  93 L


 


 01/28/25 16:00   67  30 H  173/75  92 L


 


 01/28/25 15:55   66  21  173/75  92 L


 


 01/28/25 15:40    12  


 


 01/28/25 15:25   88  11 L  159/90  93 L


 


 01/28/25 15:10   85  12  164/79  94 L


 


 01/28/25 14:55   81  10 L  179/68  96


 


 01/28/25 14:25   85  14  174/64  95


 


 01/28/25 14:10   73  33 H  161/70  90 L


 


 01/28/25 13:55   75  24  176/66  93 L


 


 01/28/25 13:40   85  14  177/75  95


 


 01/28/25 13:25   86  12  175/59  96


 


 01/28/25 13:10   92  16  163/60  96


 


 01/28/25 12:55   89  19  163/71  93 L


 


 01/28/25 12:40   78  11 L  181/67  96


 


 01/28/25 12:25  98 F  80  21  172/70  97


 


 01/28/25 12:15  98 F  72  16  172/70  95


 


 01/28/25 06:00  98.1 F  74  16  178/86  98


 


 01/28/25 03:21   75  12  127/59  98


 


 01/27/25 19:28  98.0 F  80  22  149/65  97








                                Intake and Output











 01/28/25 01/28/25 01/28/25





 06:59 14:59 22:59


 


Intake Total  225 615


 


Output Total  150 300


 


Balance  75 315


 


Intake:   


 


  IV  225 375


 


    Sodium Chloride 0.9% 1,  225 375





    000 ml @ 75 mls/hr IV .   





    [id number] [facility] Rx#:[id number]   


 


  Oral   240


 


Output:   


 


  Urine  150 300


 


Other:   


 


  Voiding Method  Bedpan Bedpan





  External Catheter External Catheter


 


  Weight  97.522 kg 

















General: Lying in bed and is not in acute distress.  Patient has a BMI of 34.7. 

Currently on room air oxygen.


Head exam was generally normal. There was no scleral icterus or corneal arcus. 

Mucous membranes were moist.


Neck was supple and without jugular venous distension, thyromegaly, or carotid 

bruits. Carotids were easily palpable bilaterally. There was no adenopathy.


Lungs were clear to auscultation and percussion, and with normal diaphragmatic 

excursion. No wheezes or rales were noted.


Cardiac exam revealed the PMI to be normally situated and sized. The rhythm was 

regular and no extrasystoles were noted during several minutes of auscultation. 

The first and second heart sounds were normal and physiologic splitting of the 

second heart sound was noted. There were no murmurs, rubs, clicks, or gallops.


Abdominal exam revealed normal bowel sounds. The abdomen was soft, non-tender, 

and without masses, organomegaly, or appreciable enlargement of the abdominal 

aorta.


Examination of the extremities revealed easily palpable radial, femoral and 

pedal pulses. There was no cyanosis, clubbing or edema.


Examination of the skin revealed no evidence of significant rashes, suspicious 

appearing nevi or other concerning lesions.


Neurologic examination is limited since patient has underlying dementia.


Patient is awake alert oriented to self.  She is following simple commands.  No 

aphasia from limited language


The pupils are round about 4 mm and reactive to light.  Visual fields are full 

to confrontation.  Extraocular movements intact no nystagmus.  Normal facial 

sensation to touch.  Patient does have mild left lower facial droop.  No 

dysarthria


Tongue is midline moves side-to-side with any difficulty


Motor: Right upper extremity right arm extension patient has about a 3 right 

hand  is 3-4.  Patient does have her right upper extremity drift.  Left arm 

flexion is about a 4 -.  Right lower extremity is 0-1.  Left upper lower 

extremity is 5 out of 5.


Sensation: She could not tell me if it feels normal to touch but it seems that 

she had normal painful stimuli bilaterally.


Reflexes 2 positive throughout except for the left lower extremity is a 1 

positive.


Plantars are mute.





Results





- Laboratory Findings


CBC and BMP: 


                                 01/28/25 10:16





                                 01/28/25 10:16


PT/INR, D-dimer











PT  11.6 sec (10.0-12.5)   01/28/25  12:11    


 


INR  1.1  (<1.2)   01/28/25  12:11    








Abnormal lab findings: 


                                  Abnormal Labs











  01/27/25 01/27/25 01/27/25





  13:08 13:08 13:27


 


WBC  13.6 H  


 


Neutrophils #  11.1 H  


 


Potassium   5.4 H 


 


Chloride   108 H 


 


BUN   25 H 


 


Creatinine   1.25 H 


 


Glucose   110 H 


 


POC Glucose (mg/dL)   


 


AST   46 H 


 


Creatine Kinase   524 H 


 


Urine Appearance    Cloudy H


 


Urine Protein    1+ H


 


Urine Blood    Small H


 


Urine Nitrite    Positive H


 


Ur Leukocyte Esterase    Large H


 


Urine RBC    8 H


 


Urine WBC    132 H


 


Urine WBC Clumps    Moderate H


 


Amorphous Sediment    Rare H


 


Urine Bacteria    Many H


 


Urine Mucus    Occasional H














  01/28/25 01/28/25 01/28/25





  10:16 10:16 10:56


 


WBC  11.3 H  


 


Neutrophils #  8.1 H  


 


Potassium   


 


Chloride   


 


BUN   26 H 


 


Creatinine   1.64 H 


 


Glucose   113 H 


 


POC Glucose (mg/dL)    121 H


 


AST   44 H 


 


Creatine Kinase   


 


Urine Appearance   


 


Urine Protein   


 


Urine Blood   


 


Urine Nitrite   


 


Ur Leukocyte Esterase   


 


Urine RBC   


 


Urine WBC   


 


Urine WBC Clumps   


 


Amorphous Sediment   


 


Urine Bacteria   


 


Urine Mucus   














Assessment and Plan


Plan: 





Acute CVA with right-sided weakness and left facial droop, status post TNK 

treatment.  Initial NIH score was 7.  CT scan of the brain showed cerebral 

atrophy and CT angiogram of the brain showed no intracranial vascular 

abnormalities and the patient has bilateral carotid artery disease, 65% on the 

right ICA and 50% of the left





Bilateral carotid artery disease





Dementia





Diabetes mellitus type 2





Chronic kidney disease





Hypothyroidism





Hypertension





UTI, currently on IV Rocephin





Plan





Monitor neurological functions in the intensive care unit


Obtain follow-up CAT scan of the brain within next 24 hours


MRI of the brain for later stage


Neurology consultation


Check HbA1c and lipid profile


Monitor blood pressure and keep the systolic blood pressure above 140 and below 

180 and avoid any significant hypotension


Cardiac rhythm is sinus


Obtain echocardiogram


Cardiac monitor


Vascular consultation


Neurology consultation


The patient intensive care unit

## 2025-01-28 NOTE — US
EXAMINATION TYPE: US carotid duplex BILAT

 

DATE OF EXAM: 1/28/2025

 

COMPARISON: CTA: TOday

 

CLINICAL INDICATION: Female, 69 years old with history of right sided weakness, code stroke; stroke

Additional History: G45.9  Transient Ischemic Attack (TIA)

various  Most positive vascular findings of extracranial vessels; specified laterality eye

     & ear disorders

 

TECHNIQUE: Grayscale, color Doppler and spectral Doppler evaluation of the bilateral carotid systems 
and vertebral arteries. Indirect Doppler criteria was utilized. 

 

FINDINGS:

 

EXAM MEASUREMENTS: 

 

RIGHT:  Peak Systolic Velocity (PSV) cm/sec

----- Right CCA:  63.4  

----- Right ICA:  135     

----- Right ECA:  57.8   

ICA/CCA ratio:  2.1    

 

RIGHT:  End Diastole cm/sec

----- Right CCA:  8.5   

----- Right ICA:  18.7      

----- Right ECA:  4.7     

 

LEFT:  Peak Systolic Velocity (PSV) cm/sec

----- Left CCA:  76.1  

----- Left ICA:  85.5   

----- Left ECA:  100  

ICA/CCA ratio:  1.1  

 

LEFT:  End Diastole cm/sec

----- Left CCA:  12.1  

----- Left ICA:  22.0   

----- Left ECA:  7.8 

 

VERTEBRALS (direction of flow):

Right Vertebral: Antegrade

Left Vertebral: Antegrade

 

Rhythm:  Normal

 

SONOGRAPHER NOTES: Plaque seen in right bulb

 

Color Doppler imaging shows patency with blood flow throughout the carotid artery. Spectral waveforms
 are within normal limits. 

 

 

 

IMPRESSION:  

Some elevation in velocities proximal right ICA indicating a moderate (50-69%) stenosis. No hemodynam
ically significant internal carotid artery stenosis on either side.

 

 

 

 

Criteria for Assigning % of Stenosis / Diameter reduction

(Estimation based on the indirect measurements of the internal carotid artery velocities (ICA PSV).

1.  Normal (no stenosis)=ICA PSV < 125 cm/s: ratio < 2.0: ICA EDV<40 cm/s.

2. Less than 50% stenosis=ICA PSV < 125 cm/s: ratio < 2.0: ICA EDV<40 cm/s.

3.  50 to 69% stenosis=ICA PSV of 125 to 230 cm/s: ration 2.0 ? 4.0: ICA EDV  cm/s.

4.  Greater than 70% stenosis to near occlusion= ICA PSV > 230 cm/s: ratio > 4.0: ICA EDV > 100 cm/s.
 

5.  Near occlusion= ICA PSV velocities may be low or undetectable: variable ratio and ICA EDV.

6.  Total occlusion=unable to detect flow.

 

 

 

 

 

[facility], Workstation: [facility], 1/28/2025 2:46 PM

## 2025-01-29 LAB
ANION GAP SERPL CALC-SCNC: 8 MMOL/L
BASOPHILS # BLD AUTO: 0.1 K/UL (ref 0–0.2)
BASOPHILS NFR BLD AUTO: 1 %
BUN SERPL-SCNC: 21 MG/DL (ref 7–17)
CALCIUM SPEC-MCNC: 9.3 MG/DL (ref 8.4–10.2)
CHLORIDE SERPL-SCNC: 109 MMOL/L (ref 98–107)
CHOLEST SERPL-MCNC: 132 MG/DL (ref 0–200)
CO2 SERPL-SCNC: 22 MMOL/L (ref 22–30)
EOSINOPHIL # BLD AUTO: 0.3 K/UL (ref 0–0.7)
EOSINOPHIL NFR BLD AUTO: 3 %
ERYTHROCYTE [DISTWIDTH] IN BLOOD BY AUTOMATED COUNT: 4.34 M/UL (ref 3.8–5.4)
ERYTHROCYTE [DISTWIDTH] IN BLOOD: 13.6 % (ref 11.5–15.5)
GLUCOSE BLD-MCNC: 149 MG/DL (ref 70–110)
GLUCOSE SERPL-MCNC: 96 MG/DL (ref 74–99)
HCT VFR BLD AUTO: 40.4 % (ref 34–46)
HDLC SERPL-MCNC: 47.1 MG/DL (ref 40–60)
HGB BLD-MCNC: 12.3 GM/DL (ref 11.4–16)
LDLC SERPL CALC-MCNC: 66 MG/DL (ref 0–131)
LYMPHOCYTES # SPEC AUTO: 1.8 K/UL (ref 1–4.8)
LYMPHOCYTES NFR SPEC AUTO: 18 %
MCH RBC QN AUTO: 28.3 PG (ref 25–35)
MCHC RBC AUTO-ENTMCNC: 30.3 G/DL (ref 31–37)
MCV RBC AUTO: 93.2 FL (ref 80–100)
MONOCYTES # BLD AUTO: 0.6 K/UL (ref 0–1)
MONOCYTES NFR BLD AUTO: 7 %
NEUTROPHILS # BLD AUTO: 6.8 K/UL (ref 1.3–7.7)
NEUTROPHILS NFR BLD AUTO: 70 %
PLATELET # BLD AUTO: 219 K/UL (ref 150–450)
POTASSIUM SERPL-SCNC: 4.4 MMOL/L (ref 3.5–5.1)
SODIUM SERPL-SCNC: 139 MMOL/L (ref 137–145)
TRIGL SERPL-MCNC: 94.7 MG/DL (ref 0–149)
VLDLC SERPL CALC-MCNC: 18.94 MG/DL (ref 5–40)
WBC # BLD AUTO: 9.7 K/UL (ref 3.8–10.6)

## 2025-01-29 RX ADMIN — ASPIRIN 325 MG ORAL TABLET STA: 325 PILL ORAL at 20:46

## 2025-01-29 RX ADMIN — ASPIRIN 81 MG CHEWABLE TABLET SCH MG: 81 TABLET CHEWABLE at 14:00

## 2025-01-29 RX ADMIN — HEPARIN SODIUM SCH UNIT: 5000 INJECTION, SOLUTION INTRAVENOUS; SUBCUTANEOUS at 20:57

## 2025-01-29 RX ADMIN — ENOXAPARIN SODIUM SCH: 40 INJECTION SUBCUTANEOUS at 20:45

## 2025-01-29 RX ADMIN — CLOPIDOGREL BISULFATE SCH MG: 75 TABLET ORAL at 16:54

## 2025-01-29 RX ADMIN — INSULIN ASPART SCH UNIT: 100 INJECTION, SOLUTION INTRAVENOUS; SUBCUTANEOUS at 12:00

## 2025-01-29 RX ADMIN — CEFAZOLIN SCH: 330 INJECTION, POWDER, FOR SOLUTION INTRAMUSCULAR; INTRAVENOUS at 20:45

## 2025-01-29 NOTE — US
EXAMINATION TYPE: US venous doppler duplex LE BI

 

DATE OF EXAM: 1/29/2025 3:29 PM

 

COMPARISON: NONE

 

CLINICAL INDICATION: Female, 69 years old with history of Lower extremity swelling; very mild swellin
g, h/o DVT in left leg, patient "thinks" years ago 

 

TECHNIQUE:  The lower extremity deep venous system is examined utilizing real time linear array sonog
joselyn with graded compression, color doppler sonography, and spectral doppler.

 

SIDE PERFORMED: Bilateral  

 

FINDINGS:

 

VESSELS IMAGED:

Common Femoral Vein

Deep Femoral Vein

Greater Saphenous Vein *

Femoral Vein

Popliteal Vein

Small Saphenous Vein *

Proximal Calf Veins

(* superficial vessels)

 

Sonographer notes:

Could not obtain bilateral femoral vein compression images because patient said they were too painful
 and would tighten thigh - good color flow with doppler bilaterally 

 

Right Leg:  Negative for DVT, Color Doppler imaging shows patency of the vessels. Spectral waveforms 
are within normal limits. There is an elongated 3.6 x 3.4 x 0.7 cm popliteal cyst on the right.

 

Left Leg:  Negative for DVT, Color Doppler imaging shows patency of the vessels. Spectral waveforms a
re within normal limits. Baker's cyst measuring 4.5 x 3.6 by 1.1 cm containing some internal debris o
r 6 mm loose body.

 

 

 

IMPRESSION: 

 

1. Some limitation noted by the sonographer as above. No visualized DVT within either lower extremity
 imaged from the groin to the upper calf.

 

2. Bilateral small Baker's cysts measuring up to 4.5 cm.

 

[facility], Workstation: [email or web address], 1/29/2025 3:36 PM

## 2025-01-29 NOTE — MR
INDICATION: 

Patient age:Female;  69 years old; 

Reason for study: stroke.  right sided weakness; PHH.

 

COMPARISON: CT brain 1/28/2025, CTA head and neck 1/28/2025, MRI brain 8/28/2023, CT brain C-spine 1/
27/2025.

 

TECHNIQUE: Multi planar, multi sequence imaging was performed through the brain without administratio
n of intravenous contrast.

 

FINDINGS: 

Mild diffuse cerebral atrophy with prominence of the ventricular system, basal cisterns, and peripher
al sulci. The gray-white junction appears unremarkable. There is 2 distinct regions of restricted dif
fusion within the posterior aspect of the left caudate nucleus body and left splenium of the corpus c
allosum. There is corresponding FLAIR signal hyperintensity. Intracranial arterial flow voids are irvin
ntained. Midline structures show no abnormality. Similar patchy areas of high T2/FLAIR signal intensi
ty are seen within the periventricular and subcortical white matter. The susceptibility weighted imag
es demonstrate prior hemosiderin deposition within the right frontal subdural space.

 

The bone marrow signal is within normal limits. Bilateral aphakia. Left anterior inferior maxillary s
inus 1.2 cm mucous retention cyst. Remaining paranasal sinuses appear clear.

 

IMPRESSION:

1. Acute/subacute ischemia within the posterior aspect of the left caudate nucleus body and left sple
nium of the corpus callosum.

 

2. Similar mild to moderate nonspecific white matter changes, likely related to small vessel ischemic
 disease.

 

 

[facility], Workstation: [id number], 1/29/2025 10:37 AM

## 2025-01-29 NOTE — P.PN
Subjective


Progress Note Date: 01/29/25


Principal diagnosis: 





Carotid stenosis





Patient seen and examined as a follow-up for possible stroke and carotid 

stenosis.  No acute changes through the night.  No new focal deficits.  She is 

status post tPA.  Brain MRI reports acute/subacute ischemia within the posterior

aspect of the left caudate nucleus body and left splenium of the corpus 

callosum.  Similar mild to moderate nonspecific white matter changes, likely 

related to small vessel ischemic disease.  She also had a repeat brain CT 

reporting moderate patchy burden of chronic small vessel ischemic disease.  

Hypodensity along the left basal ganglia and splenium of the left corpus 

callosum minimally more pronounced.  Corresponding to the site of acute/subacute

infarct on MRI earlier today.  No hemorrhagic transformation.


Echocardiogram reports mild to moderate concentric LVH.  Mild right ventricular 

prominence.  Moderate dilation of both atria.  Heavy mitral annular 

calcification with thickening of mitral valve leaflets and some calcific mild 

stenosis.  Aortic valve reveals some sclerosis no restriction.  Mild mitral and 

tricuspid regurgitation.  No pericardial effusion.  Possible fat.  Saline 

contrast suggest a possible PFO





Objective





- Vital Signs


Vital signs: 


                                   Vital Signs











Temp  97.6 F   01/29/25 08:00


 


Pulse  71   01/29/25 13:00


 


Resp  9 L  01/29/25 13:00


 


BP  162/67   01/29/25 13:00


 


Pulse Ox  98   01/29/25 13:00


 


FiO2      








                                 Intake & Output











 01/28/25 01/29/25 01/29/25





 18:59 06:59 18:59


 


Intake Total 765 1380 75


 


Output Total 250 625 175


 


Balance 515 755 -100


 


Weight 97.522 kg 107.7 kg 


 


Intake:   


 


   900 75


 


    Sodium Chloride 0.9% 1, 525 900 75





    000 ml @ 75 mls/hr IV .   





    [id number] [facility] Rx#:[id number]   


 


  Oral 240 480 


 


Output:   


 


  Urine 250 625 175


 


Other:   


 


  Voiding Method Bedpan External Catheter External Catheter





 External Catheter  


 


  # Bowel Movements  0 0














- Exam





General appearance: The patient is alert, oriented, appears in no acute 

distress.  Obese.


HET: Head is normocephalic and atraumatic.  Pupils are equal and reactive.  


Neck: Supple.  No audible bruit.


Heart: Regular.


Lungs: Equal expansion, normal respiratory effort.


Abdomen: Soft, nontender, nondistended.


Extremities: Normal skin color and turgor.  Palpable bilateral radial and DP 

pulses.


Neurological: Patient is alert and oriented.  Speech is fluent, answers question

s appropriately.  Right sided upper and lower extremity weakness.  Appears to 

have a left facial droop with smile. 








- Labs


CBC & Chem 7: 


                                 01/30/25 05:37





                                 01/29/25 05:52


Labs: 


                  Abnormal Lab Results - Last 24 Hours (Table)











  01/29/25 01/29/25 Range/Units





  05:52 05:52 


 


MCHC   30.3 L  (31.0-37.0)  g/dL


 


Chloride  109 H   ()  mmol/L


 


BUN  21 H   (7-17)  mg/dL


 


Creatinine  1.34 H   (0.52-1.04)  mg/dL








                      Microbiology - Last 24 Hours (Table)











 01/27/25 13:27 Urine Culture - Preliminary





 Urine,Voided    Gram Neg Bacilli














Assessment and Plan


Assessment: 





1.  Right ICA stenosis 65%, left greater than 50% per CTA 


2.  Acute/subacute ischemic stroke within the posterior aspect of left caudate 

nucleus body and left splenium of the corpus callosum with symptoms of right 

sided weakness


3.  Possible PFO reported on echocardiogram


4.  Recent fall


5.  Urinary tract infection


6.  Hypertension


7.  Dementia


Plan: 





1.  Carotid duplex ordered and reviewed


2.  Await further evaluation and recommendations from neurology


3.  Patient is status post T.enecteplase


4.  PT, OT, ST on consult


5.  Recommend cardiology consultation to evaluate for PFO


6.  Will get B/L LEvenous duplex


6.  Further recommendations forthcoming based on clinical course


Thank you for this consultation, we will follow along.








The impression and plan of care has been dictated as directed.





Dr. Ailyn Bauman performed a history and examination of this patient,  discussed the same with 

the dictator.  I agree with the dictator's note ,documented as a scribe.  Any 

additional findings or plans will be noted.





I personally reviewed imaging and do believe there is greater than 50% stenosis 

of the left ICA.  Await further recommendations and evaluations by cardiology, 

if no obvious findings and correlation to possible source of embolic stroke, 

will consider carotid intervention

## 2025-01-29 NOTE — P.PN
Subjective


Progress Note Date: 01/29/25


I am following up with the patient and she continues to have right sided 

weakness.


I personally spoke with the patient's daughter was at bedside and her sister via

phone and it seems that the patient has early onset dementia as well as she has 

some developmental delay at baseline with underlying speech difficulty.








Objective





- Vital Signs


Vital signs: 


                                   Vital Signs











Temp  97.6 F   01/29/25 08:00


 


Pulse  66   01/29/25 14:00


 


Resp  20   01/29/25 14:00


 


BP  164/70   01/29/25 14:00


 


Pulse Ox  97   01/29/25 14:00


 


FiO2      








                                 Intake & Output











 01/28/25 01/29/25 01/29/25





 18:59 06:59 18:59


 


Intake Total 765 1380 875


 


Output Total 250 625 675


 


Balance 515 755 200


 


Weight 97.522 kg 107.7 kg 


 


Intake:   


 


   900 375


 


    Sodium Chloride 0.9% 1, 525 900 375





    000 ml @ 75 mls/hr IV .   





    [id number] POOJA Rx#:[id number]   


 


  Oral 240 480 500


 


Output:   


 


  Urine 250 625 675


 


Other:   


 


  Voiding Method Bedpan External Catheter External Catheter





 External Catheter  


 


  # Bowel Movements  0 0














- Exam


General: Lying in bed and is not in acute distress.


HENT: Supple neck.


Neuro:


Limited since patient has underlying dementia with speech difficulty.


Patient is awake alert oriented to self.  She is following simple commands.  

Limited language. 


The pupils are round about 4 mm and reactive to light.  Visual fields are full 

to confrontation.  Extraocular movements intact no nystagmus.  Normal facial 

sensation to touch.  Patient does have mild left lower facial droop.  No dysa

rthria


Tongue is midline moves side-to-side with any difficulty


Motor: Right upper extremity 4. Right lower extremity is 1-2 with multiple 

attempts. Left upper lower extremity is 5 out of 5.


Sensation: She could not tell me if it feels normal to touch but it seems that 

she had normal painful stimuli bilaterally.


Reflexes 2 positive throughout except for the left lower extremity is a 1 

positive.


Plantars are mute.





Some of the work-up during this hospital visit conisted of:


Lipid panel is triglycerides 94, cholesterol is 132, LDL 66 and HDL is 47.


Hemoglobin A1c is 5.9.


CT of the head is reported as similar mild ventriculomegaly likely due to 

central cerebral atrophy.  Correlate with symptoms to exclude component of NPH. 

Mild to moderate patchy border of chronic small vessel ischemic disease.  No 

acute intracranial abnormality seen.  I personally reviewed the CT and agree 

there is no acute or subacute stroke.


CTA neck: Atherosclerotic changes of bilateral carotid bifurcation this results 

moderate 65% proximal right ICA stenosis on the right and moderate just over 50%

proximal left ICA stenosis on the left.  No hemodynamically significant ICA 

stenosis identified.  Anatomic variation with takeoff of the left vertebral 

artery directly from the aortic arch.


CTA head: No large vessel intracranial arterial occlusion, significant stenosis 

or aneurysm changes seen


2D echo: Normal left ventricular size and stock function with mild to moderate 

concentric left ventricular hypertrophy.  Mild right ventricular prominence.  

Moderate dilation of both atria.  There is heavy mitral annular calcification 

with thickening of mitral valve leaflets and some calcific mild stenosis.  

Aortic valve reveals some sclerosis no restriction.  Saline contrast suggested 

possible PFO


Carotid Duplex is reported as some elevation in velocity but with proximal right

ICA indicating moderate 50 to 69% stenosis.  No hemodynamically significant 

internal carotid artery stenosis on either side.


MRI of the brain is reported as acute/subacute ischemic within the posterior 

aspect of the left caudate nucleus body in the left splenium of the corpus 

callosum.


24-hour post IV TNK is reported as moderate patchy burden of chronic small 

vessel ischemic disease.  Hypodensity along the left basal ganglia and splenium 

of the left corpus callosum minimally more pronounced corresponding to the site 

of acute/subacute infarct on MRI earlier today.  No hemorrhagic transformation





- Labs


CBC & Chem 7: 


                                 01/29/25 05:52





                                 01/29/25 05:52


Labs: 


                  Abnormal Lab Results - Last 24 Hours (Table)











  01/29/25 01/29/25 Range/Units





  05:52 05:52 


 


MCHC   30.3 L  (31.0-37.0)  g/dL


 


Chloride  109 H   ()  mmol/L


 


BUN  21 H   (7-17)  mg/dL


 


Creatinine  1.34 H   (0.52-1.04)  mg/dL








                      Microbiology - Last 24 Hours (Table)











 01/27/25 13:27 Urine Culture - Preliminary





 Urine,Voided    Gram Neg Bacilli














Assessment and Plan


Assessment: 


This is a 69-year-old woman present emergency department on 01/27/2025 because 

of a fall the night prior.  Seems the patient has recurrent falls.  This 

hospital visit seems to the patient has probable acute urinary tract infection. 

Today it was noted the patient had right sided weakness and left facial droop 

upon being evaluated by physical therapy around 10-izzy a.m. and last normal 

reported around 8-zizy a.m. by resident from primary team.  





Acute right sided weakness (predominately right lower >> upper) as well as left 

facial droop is likely acute ischemic stroke status post IV TNK.  Her initial 

NIH stroke scale was a 7 and the repeat was a 5.  MRI Brain reveals 

acute/subacute ischemic within the posterior aspect of the left caudate nucleus 

body in the left splenium of the corpus callosum.  Unknown exact.  No afib or 

flutter.  Has possible PFO on 2D echo.


Right ICA stenosis of about 65% and left ICA of 50% on CT angiography but allred

tid duplex right is 50-69% and that seems asymptomatic.  While on left ICA on 

duplex is no hemodynamically significant stenosis which is discordant on both 

imaging.


Acute urinary tract infection


Recurrent falls


Underlying history of dementia and per the resident from primary team he states 

the patient confabulates at baseline


Underlying history of developmentally delayed


Hypothyroidism


Diabetes mellitus





 


Plan: 


Repeat CT head is negative for bleed.


She was resumed on ASA 81mg daily by primary team.  I also added Plavix 75mg 

daily.


Continue Lipitor 40 mg daily.


I consulted cardiology team for possible PFO and for YUMIKO.


Recommend 30 days event monitor.


Continue neurochecks 


Cardiac monitoring


PT OT and SLP are consulted


I consulted vascular surgery team for the carotid stenosis


Will defer the rest of the medical management to primary and other specialist


For DVT prophylaxis: started on subq heparin 5000 unit every 12 hours.





Plan discussed with her daughter who is at bedside, sister via phone and her 

nurse.





UPDATED:


I spoke with the vascular surgeon (Dr. Coles) team and she stated she think 

right ICA is greater than 50% stenosis and if YUMIKO is unremarkable she would like

to consider intervention.





Time with Patient: Less than 30

## 2025-01-29 NOTE — CT
EXAMINATION TYPE: CT brain wo con

 

DATE OF EXAM: 1/29/2025 11:26 AM

 

COMPARISON: 1/28/2025 

 

CLINICAL INDICATION: Female, 69 years old with history of post 24hrs TNK for stroke, , 

 

TECHNIQUE:  Examination was done in axial plane without intravenous contrast.  Coronal and sagittal r
econstructions performed.

 

CT DLP: 1100.6 mGycm, Automated exposure control for dose reduction was used.

 

FINDINGS:

There is no evidence of  acute intracranial hemorrhage, acute ischemic changes, mass, mass-effect, or
 extra-axial fluid collection.  There is no effacement of cerebral sulci or basal subarachnoid cister
ns. Similar mild ventricular prominence and moderate patchy white matter hypodensities. There is no m
idline shift.  Gray-white matter distinction is preserved.

 

Hypodensity posterior left basal ganglia and left splenium of the corpus callosum may be minimally mo
re pronounced, axial image 25 and 26.

 

Redemonstrated benign basal ganglionic calcifications.

 

Orbits and globes are intact. No genie opacification of the paranasal sinuses. Prominent cerumen deep
 in the external auditory canals. Mastoid air cells well pneumatized.

 

 

IMPRESSION:

 

Moderate patchy burden of chronic small vessel ischemic disease. Hypodensity along the left basal maico
glia and splenium of the left corpus callosum minimally more pronounced, corresponding to the site of
 acute/subacute infarct on MRI earlier today. No hemorrhagic transformation.

 

[facility], Workstation: [id number], 1/29/2025 11:38 AM

## 2025-01-29 NOTE — P.PN
Subjective


Progress Note Date: 01/29/25


History of present illness;


Patient is a 69-year-old female with a past medical history significant for syed

betes mellitus, thyroid disorder.  She presented to the emergency department 

after falling at home.  She states that she has frequent falls, with the most 

recent one being last night which brought her to the emergency department today.

 She states that she was in her apartment and attempting to clean her apartment,

at that time she states that she lost her footing and fell on her left side.  

She states that she did not hit her head, however she does attest to having felt

some dizziness prior to falling.  She was found on the ground by her son-in-law,

and she is unsure of how long she was on the ground for but believes it to be at

least 2 hours stating that she was too weak to get herself up.  She has no acute

pain anywhere on her body, however notes some generalized soreness in her lower 

extremities and her right shoulder.  She lives alone and states that she is able

to handle most activities of daily living by herself.  She states that she has 

not taken any anticoagulation medications.





1/28/25 - Patient seen and examined at bedside.  She had no events overnight, 

stated she was feeling better.  With no acute complaints.





Was contacted by the nurse at approximately 10:45 AM noting there was new onset 

weakness in the patient's right side, asking if this was new compared to yesterd

ay when the patient was previously seen.  Went down and saw the patient in the 

observation unit to assess, her right arm was in decerebrate positioning and 

noted she had an inability to move right lower extremity, drift when extending 

her right upper extremity.  Additionally, patient was noted to have left-sided 

facial droop, as well as left-sided deviation of the tongue when protruding.  

During this time she was able to fully converse and answer questions 

appropriately, although maintaining her circumstantial speech as well as 

confabulation.  At that time code stroke was called.





1/29/25 - Patient seen and examined at bedside this morning, remaining in the 

ICU for close monitoring.  She states she is feeling better than yesterday.  

Notes increased ability to move her right upper and lower extremity.  It has 

been noted by the nursing staff that she has intermittent moments of the ability

to have function of her right lower extremity.  She has no acute complaints at 

this time and states that the suprapubic tenderness she had been experiencing 

has resolved.





PHYSICAL EXAMINATION: 


GENERAL: The patient is alert and oriented x3, with circumstantial speech with 

confabulation


HEENT: Pupils are round and equally reacting to light. EOMI. No scleral icterus.

No conjunctival pallor.


CARDIOVASCULAR: S1 and S2 present. No murmurs, rubs, or gallops. 


PULMONARY: Chest is clear to auscultation, no wheezing or crackles. 


ABDOMEN: Soft, nontender, nondistended, normoactive bowel sounds. No palpable 

organomegaly. 


MUSCULOSKELETAL: No joint swelling or deformity.


EXTREMITIES: No cyanosis, clubbing, or pedal edema. 


NEUROLOGICAL: Some mobility of the right lower extremity restored, able to 

wiggle her toes and move her foot at the ankle and plantar and dorsi flexion; 

drift with extension of right upper extremity; right upper extremity noted to be

into 3 repositioning;  strength on the right side improved as compared to 

yesterday; left sided facial droop and tongue deviation 


SKIN: No rashes.





New data today:


Labs: WBCs 9.7, hemoglobin 12.3, hematocrit 40.4, platelet 219; sodium 139, 

potassium 4.4, BUN 21, creatinine 1.34


Imaging:


-Carotid Doppler study completed on 1/28 showed some elevation in velocities in 

the proximal right ICA which indicate moderate (50 to 69%) stenosis, without 

hemodynamically significant internal carotid artery stenosis on either side.


-Brain MRI showed acute/subacute ischemia within the posterior aspect of the 

left caudate nucleus body and left splenium of the corpus callosum; similar mild

to moderate nonspecific white matter changes, likely related small vessel 

ischemic disease


-Repeat CT head done this morning completed showed no evidence of any bleed; 

moderate patchy burden of chronic small vessel ischemic disease along with 

hypodensity along the left basal ganglia which may suggest an acute/subacute 

infarct.





Assessment and plan


69-year-old female with PMH of diabetes mellitus, thyroid disorder.  Presents 

emergency department following a fall at home in which she notes to having some 

dizziness prior to falling.





#New onset right-sided weakness


#History of previous stroke ~30 years ago


-Last known normal ~7:30-8:00 am


-~10:45 a.m. patient was noted to have weakness of her right lower extremity


-Code stroke called


-CT brain completed, read and reviewed


-CT angiography head/neck read and reviewed


-Carotid Doppler read and reviewed


-MRI brain w/o contrast completed, read and reviewed


-Repeat CT brain (1/29/2025) completed, read and reviewed


-Patient received TNK 24.5 mg IV push once


-Initiated aspirin 325 mg once this morning (1/28/2025)


-Continue with aspirin 81 mg daily


-Per neurology recommendation patient started on Plavix 75 mg daily


-Initiated 40 mg Lipitor daily


-Continue neurochecks


-Triglycerides 94.7, cholesterol 132, LDL 66, VLDL 18.94, HDL 47.10


-Hemoglobin A1c 5.9


-Review CBC and BMP


-PT, OT, ST on consult


-Cardiology consulted for possible PFO and for YUMIKO; per neurology's 

recommendation patient should be considered for 30-day event monitor


-Per the neurology team, spoke with vascular surgery (Dr. Coles) and she states 

that it is possible of the right ICA is greater than 50% stenosed and if the YUMIKO

is unremarkable, intervention may be considered.





#Syncopy v Mechanical fall


#Debility


-Echocardiogram pending


-Orthostatics pending


-Cardiac monitoring


-On Aricept, monitor for bradycardic episodes - if occurring, consider D/C





#Complicated urinary tract infection (gram-negative negative bacilli)


-WBC 13.6 with associated tenderness of the bladder


-Urinalysis positive with nitrites and leukocyte Estrace


-Urine culture pending


-Patient will continue with Rocephin 1 g IVPB every 24 hours (day 3 of 

antibiotics); with symptom improvement within 48 hours, consider course of 5-10 

days


-Continue on NS 20 cc/h


-Monitor CBC, BMP





Chronic conditions:


#Dementia - continue home medications


#Hypothyroidism - Continue home medications


#Chronic kidney disease stage IIIb





GI prophylaxis: Not indicated


DVT prophylaxis: Heparin 5000 units every 12 hours





Dictation was produced using dragon dictation software. please excuse any 

grammatical, word or spelling errors. 





I saw and evaluated the patient during the key and critical portions of this 

encounter, and discussed the case in detail with the resident author of this 

note, I agree with the Assessment and Plan, and my changes, if any, are 

highlighted below.





Patient was seen and examined. CBC and BMP significant for MCHC 30.3, Cl 109, 

BUN 21, Cr 1.34. MRI confirms CVA. 





Cardiology consulted to evaluate for PFO.


Vascular surgery on board for carotid stenosis.


ASA 81 mg PO QD. Plavix 75 mg PO QD. Lipitor 40 mg PO QD.


Updated PCP Alfred NP with [facility].





Objective





- Vital Signs


Vital signs: 


                                   Vital Signs











Temp  98.0 F   01/29/25 04:00


 


Pulse  70   01/29/25 05:30


 


Resp  19   01/29/25 05:30


 


BP  163/70   01/29/25 05:30


 


Pulse Ox  99   01/29/25 05:30


 


FiO2      








                                 Intake & Output











 01/28/25 01/28/25 01/29/25





 06:59 18:59 06:59


 


Intake Total  765 1305


 


Output Total  250 450


 


Balance  515 855


 


Weight  97.522 kg 107.7 kg


 


Intake:   


 


  IV  525 825


 


    Sodium Chloride 0.9% 1,  525 825





    000 ml @ 75 mls/hr IV .   





    [id number] POOJA Rx#:[id number]   


 


  Oral  240 480


 


Output:   


 


  Urine  250 450


 


Other:   


 


  Voiding Method  Bedpan External Catheter





  External Catheter 


 


  # Bowel Movements   0














- Labs


CBC & Chem 7: 


                                 01/29/25 05:52





                                 01/29/25 05:52


Labs: 


                  Abnormal Lab Results - Last 24 Hours (Table)











  01/28/25 01/28/25 01/28/25 Range/Units





  10:16 10:16 10:56 


 


WBC  11.3 H    (3.8-10.6)  k/uL


 


Neutrophils #  8.1 H    (1.3-7.7)  k/uL


 


BUN   26 H   (7-17)  mg/dL


 


Creatinine   1.64 H   (0.52-1.04)  mg/dL


 


Glucose   113 H   (74-99)  mg/dL


 


POC Glucose (mg/dL)    121 H  ()  mg/dL


 


AST   44 H   (14-36)  U/L








                      Microbiology - Last 24 Hours (Table)











 01/27/25 13:27 Urine Culture - Preliminary





 Urine,Voided    Gram Neg Bacilli

## 2025-01-29 NOTE — P.PN
Subjective


Progress Note Date: 01/29/25








This is a 69-year-old female patient was brought into the intensive.


Receiving thrombolytics for new onset right-sided weakness and left facial 

droop.  The patient is not have dementia.  She is known to have diabetes 

mellitus with chronic kidney disease.  She is also known to have hypertension 

hyperlipidemia.  She has been having episodes of falls and earlier this morning,

the patient was noted to have right-sided weakness.  CT scan of the head showed 

mild ventriculomegaly and cerebral atrophy.  Chronic small vessel ischemic 

changes.  CTA of the neck showed 65% proximal right ICA and 50% proximal left 

ICA and the carotid Doppler showed no significant hemodynamically stenosis of 

the carotids.  CTA of the head and neck showed no large vessel intracranial 

arterial occlusion.  Initial NIH score was 7.  The patient was given TNK and 

following that she was admitted to the intensive care unit.  Motor function in 

the right upper extremity is adequate.  There is some ongoing weakness in the 

right lower extremity.  She is awake alert and communicating.  Family at the 

bedside.  No vision changes.  No headaches.  No seizure activity.  She has been 

found to have UTI and currently the patient is on IV Rocephin.  Afebrile and 

hemodynamically stable.  Normal coagulation profile.  Creatinine is at 1.4 

consistent with chronic kidney disease with a GFR of 32.  Hemoglobin is at 12.8.





On 1/29/2025, the patient is being seen for a follow-up.  The patient is status 

post TNK for an acute CVA.  She remains awake and alert.  No headaches.  No 

altered mentation.  Significant weakness in the right lower extremity.  Motor 

function is improved in the right upper extremity which is estimated to be 

around 4 out of 5.  No significant facial asymmetry.  Able to swallow.  A 

follow-up CAT scan of the brain was done today and it shows no evidence of any 

bleeds.  There is moderate patchy burden of chronic small vessel ischemic 

disease along with hypodensity along the left basal ganglia may suggest an 

acute/subacute infarct.  There is also abnormalities in the left corpus 

callosum.  Hemodynamically stable.  No significant hypotension.  The white cell 

count of 9.7, hemoglobin 12.3, platelet count is 219.  Creatinine is 1.3 with a 

BUN of 21.  LDL cholesterol is 66.  HbA1c was at 5.9.





Objective





- Vital Signs


Vital signs: 


                                   Vital Signs











Temp  97.6 F   01/29/25 08:00


 


Pulse  76   01/29/25 09:00


 


Resp  11 L  01/29/25 09:00


 


BP  140/94   01/29/25 09:00


 


Pulse Ox  96   01/29/25 09:00


 


FiO2      








                                 Intake & Output











 01/28/25 01/29/25 01/29/25





 18:59 06:59 18:59


 


Intake Total 765 1380 75


 


Output Total 250 625 175


 


Balance 515 755 -100


 


Weight 97.522 kg 107.7 kg 


 


Intake:   


 


   900 75


 


    Sodium Chloride 0.9% 1, 525 900 75





    000 ml @ 75 mls/hr IV .   





    [id number] [facility] Rx#:[id number]   


 


  Oral 240 480 


 


Output:   


 


  Urine 250 625 175


 


Other:   


 


  Voiding Method Bedpan External Catheter External Catheter





 External Catheter  


 


  # Bowel Movements  0 0














- Exam








General: Lying in bed and is not in acute distress.  Patient has a BMI of 34.7. 

Currently on room air oxygen.


Head exam was generally normal. There was no scleral icterus or corneal arcus. 

Mucous membranes were moist.


Neck was supple and without jugular venous distension, thyromegaly, or carotid 

bruits. Carotids were easily palpable bilaterally. There was no adenopathy.


Lungs were clear to auscultation and percussion, and with normal diaphragmatic 

excursion. No wheezes or rales were noted.


Cardiac exam revealed the PMI to be normally situated and sized. The rhythm was 

regular and no extrasystoles were noted during several minutes of auscultation. 

The first and second heart sounds were normal and physiologic splitting of the 

second heart sound was noted. There were no murmurs, rubs, clicks, or gallops.


Abdominal exam revealed normal bowel sounds. The abdomen was soft, non-tender, 

and without masses, organomegaly, or appreciable enlargement of the abdominal 

aorta.


Examination of the extremities revealed easily palpable radial, femoral and 

pedal pulses. There was no cyanosis, clubbing or edema.


Examination of the skin revealed no evidence of significant rashes, suspicious 

appearing nevi or other concerning lesions.


Neurologic examination is limited since patient has underlying dementia.


Patient is awake alert oriented to self.  She is following simple commands.  No 

aphasia from limited language


The pupils are round about 4 mm and reactive to light.  Visual fields are full 

to confrontation.  Extraocular movements intact no nystagmus.  Normal facial 

sensation to touch.  Patient does have mild left lower facial droop.  No 

dysarthria


Tongue is midline moves side-to-side with any difficulty


Motor: Right upper extremity right arm extension patient has about a 3 right 

hand  is 3-4.  Patient does have her right upper extremity drift.  Left arm 

flexion is about a 4 -.  Right lower extremity is 0-1.  Left upper lower 

extremity is 5 out of 5.


Sensation: She could not tell me if it feels normal to touch but it seems that 

she had normal painful stimuli bilaterally.


Reflexes 2 positive throughout except for the left lower extremity is a 1 

positive.





- Labs


CBC & Chem 7: 


                                 01/29/25 05:52





                                 01/29/25 05:52


Labs: 


                  Abnormal Lab Results - Last 24 Hours (Table)











  01/28/25 01/28/25 01/28/25 Range/Units





  10:16 10:16 10:56 


 


WBC  11.3 H    (3.8-10.6)  k/uL


 


MCHC     (31.0-37.0)  g/dL


 


Neutrophils #  8.1 H    (1.3-7.7)  k/uL


 


Chloride     ()  mmol/L


 


BUN   26 H   (7-17)  mg/dL


 


Creatinine   1.64 H   (0.52-1.04)  mg/dL


 


Glucose   113 H   (74-99)  mg/dL


 


POC Glucose (mg/dL)    121 H  ()  mg/dL


 


AST   44 H   (14-36)  U/L














  01/29/25 01/29/25 Range/Units





  05:52 05:52 


 


WBC    (3.8-10.6)  k/uL


 


MCHC   30.3 L  (31.0-37.0)  g/dL


 


Neutrophils #    (1.3-7.7)  k/uL


 


Chloride  109 H   ()  mmol/L


 


BUN  21 H   (7-17)  mg/dL


 


Creatinine  1.34 H   (0.52-1.04)  mg/dL


 


Glucose    (74-99)  mg/dL


 


POC Glucose (mg/dL)    ()  mg/dL


 


AST    (14-36)  U/L








                      Microbiology - Last 24 Hours (Table)











 01/27/25 13:27 Urine Culture - Preliminary





 Urine,Voided    Gram Neg Bacilli














Assessment and Plan


Plan: 





Acute CVA with right-sided weakness and left facial droop, status post TNK 

treatment.  Initial NIH score was 7.  CT scan of the brain showed cerebral 

atrophy and CT angiogram of the brain showed no intracranial vascular 

abnormalities and the patient has bilateral carotid artery disease, 65% on the 

right ICA and 50% of the left.  Follow-up CAT scan of the brain showed no acute 

hemorrhage.  Changes in the left corpus callosum and basal ganglia indicating 

acute/subacute infarct.  Neurologically unchanged compared to yesterday.





Bilateral carotid artery disease





Dementia





Diabetes mellitus type 2





Chronic kidney disease





Hypothyroidism





Hypertension





UTI, currently on IV Rocephin





Plan





Monitor neurological functions 


Follow-up CAT scan of the brain was noted


MRI of the brain for later stage


Neurology consultation is appreciated


Start aspirin


Monitor blood pressure and keep the systolic blood pressure above 140 and below 

180 and avoid any significant hypotension


Cardiac rhythm is sinus


Obtain echocardiogram is pending


Cardiac monitor


Vascular consultation


Rocephin for gram-negative urine tract infection


May transfer out of the intensive care unit.

## 2025-01-30 LAB
ANION GAP SERPL CALC-SCNC: 7 MMOL/L
BASOPHILS # BLD AUTO: 0 K/UL (ref 0–0.2)
BASOPHILS NFR BLD AUTO: 0 %
BUN SERPL-SCNC: 18 MG/DL (ref 7–17)
CALCIUM SPEC-MCNC: 9 MG/DL (ref 8.4–10.2)
CHLORIDE SERPL-SCNC: 108 MMOL/L (ref 98–107)
CO2 SERPL-SCNC: 24 MMOL/L (ref 22–30)
EOSINOPHIL # BLD AUTO: 0.3 K/UL (ref 0–0.7)
EOSINOPHIL NFR BLD AUTO: 3 %
ERYTHROCYTE [DISTWIDTH] IN BLOOD BY AUTOMATED COUNT: 4.27 M/UL (ref 3.8–5.4)
ERYTHROCYTE [DISTWIDTH] IN BLOOD: 13.7 % (ref 11.5–15.5)
GLUCOSE BLD-MCNC: 123 MG/DL (ref 70–110)
GLUCOSE BLD-MCNC: 131 MG/DL (ref 70–110)
GLUCOSE BLD-MCNC: 132 MG/DL (ref 70–110)
GLUCOSE BLD-MCNC: 140 MG/DL (ref 70–110)
GLUCOSE SERPL-MCNC: 118 MG/DL (ref 74–99)
HCT VFR BLD AUTO: 38 % (ref 34–46)
HGB BLD-MCNC: 11.9 GM/DL (ref 11.4–16)
LYMPHOCYTES # SPEC AUTO: 1.7 K/UL (ref 1–4.8)
LYMPHOCYTES NFR SPEC AUTO: 16 %
MCH RBC QN AUTO: 27.9 PG (ref 25–35)
MCHC RBC AUTO-ENTMCNC: 31.4 G/DL (ref 31–37)
MCV RBC AUTO: 88.9 FL (ref 80–100)
MONOCYTES # BLD AUTO: 0.6 K/UL (ref 0–1)
MONOCYTES NFR BLD AUTO: 6 %
NEUTROPHILS # BLD AUTO: 7.6 K/UL (ref 1.3–7.7)
NEUTROPHILS NFR BLD AUTO: 73 %
PLATELET # BLD AUTO: 223 K/UL (ref 150–450)
POTASSIUM SERPL-SCNC: 4.4 MMOL/L (ref 3.5–5.1)
SODIUM SERPL-SCNC: 139 MMOL/L (ref 137–145)
WBC # BLD AUTO: 10.4 K/UL (ref 3.8–10.6)

## 2025-01-30 RX ADMIN — ERTAPENEM SODIUM SCH MLS/HR: 1 INJECTION, POWDER, LYOPHILIZED, FOR SOLUTION INTRAMUSCULAR; INTRAVENOUS at 21:35

## 2025-01-30 RX ADMIN — ERTAPENEM SODIUM STA MLS/HR: 1 INJECTION, POWDER, LYOPHILIZED, FOR SOLUTION INTRAMUSCULAR; INTRAVENOUS at 00:04

## 2025-01-30 NOTE — P.PN
Subjective


Progress Note Date: 01/30/25


History of present illness;


Patient is a 69-year-old female with a past medical history significant for syde

betes mellitus, thyroid disorder.  She presented to the emergency department 

after falling at home.  She states that she has frequent falls, with the most 

recent one being last night which brought her to the emergency department today.

 She states that she was in her apartment and attempting to clean her apartment,

at that time she states that she lost her footing and fell on her left side.  

She states that she did not hit her head, however she does attest to having felt

some dizziness prior to falling.  She was found on the ground by her son-in-law,

and she is unsure of how long she was on the ground for but believes it to be at

least 2 hours stating that she was too weak to get herself up.  She has no acute

pain anywhere on her body, however notes some generalized soreness in her lower 

extremities and her right shoulder.  She lives alone and states that she is able

to handle most activities of daily living by herself.  She states that she has 

not taken any anticoagulation medications.





1/28/25 - Patient seen and examined at bedside.  She had no events overnight, 

stated she was feeling better.  With no acute complaints.





Was contacted by the nurse at approximately 10:45 AM noting there was new onset 

weakness in the patient's right side, asking if this was new compared to yesterd

ay when the patient was previously seen.  Went down and saw the patient in the 

observation unit to assess, her right arm was in decerebrate positioning and 

noted she had an inability to move right lower extremity, drift when extending 

her right upper extremity.  Additionally, patient was noted to have left-sided 

facial droop, as well as left-sided deviation of the tongue when protruding.  

During this time she was able to fully converse and answer questions 

appropriately, although maintaining her circumstantial speech as well as 

confabulation.  At that time code stroke was called.





1/29/25 - Patient seen and examined at bedside this morning, remaining in the 

ICU for close monitoring.  She states she is feeling better than yesterday.  

Notes increased ability to move her right upper and lower extremity.  It has 

been noted by the nursing staff that she has intermittent moments of the ability

to have function of her right lower extremity.  She has no acute complaints at 

this time and states that the suprapubic tenderness she had been experiencing 

has resolved.





1/30/25 - Patient seen and examined at bedside this morning, she was moved out 

of the ICU yesterday afternoon and is now on cardiac step down, 3 south.  States

she is feeling better this morning than she had been.  Has no acute complaints 

at this time.





PHYSICAL EXAMINATION: 


GENERAL: The patient is alert and oriented x3, with circumstantial speech with 

confabulation


HEENT: Pupils are round and equally reacting to light. EOMI. No scleral icterus.

No conjunctival pallor.


CARDIOVASCULAR: S1 and S2 present. No murmurs, rubs, or gallops. 


PULMONARY: Chest is clear to auscultation, no wheezing or crackles. 


ABDOMEN: Soft, nontender, nondistended, normoactive bowel sounds. No palpable 

organomegaly. 


MUSCULOSKELETAL: No joint swelling or deformity.


EXTREMITIES: No cyanosis, clubbing, or pedal edema. 


NEUROLOGICAL: Some mobility of the right lower extremity restored, able to 

wiggle her toes and move her foot at the ankle and plantar and dorsi flexion; 

drift with extension of right upper extremity; right upper extremity noted to be

into 3 repositioning;  strength on the right side improved as compared to 

yesterday; left sided facial droop and tongue deviation 


SKIN: No rashes.





New data today:


Labs: WBCs 10.4, Hgb 11.9, hematocrit 38.0, platelet 223


Urine culture: Grew ESBL MDRO Klebsiella pneumonia with a resistance to 

ceftriaxone (along with multiple other drugs)





Assessment and plan


69-year-old female with PMH of diabetes mellitus, thyroid disorder.  Presents 

emergency department following a fall at home in which she notes to having some 

dizziness prior to falling.





#New onset right-sided weakness


#History of previous stroke ~30 years ago


-Last known normal ~7:30-8:00 am


-~10:45 a.m. patient was noted to have weakness of her right lower extremity


-Code stroke called


-CT brain completed, read and reviewed


-CT angiography head/neck read and reviewed


-Carotid Doppler read and reviewed


-MRI brain w/o contrast completed, read and reviewed


-Repeat CT brain (1/29/2025) completed, read and reviewed


-Patient received TNK 24.5 mg IV push once


-Initiated aspirin 325 mg once this morning (1/28/2025)


-Continue with aspirin 81 mg daily


-Per neurology recommendation patient started on Plavix 75 mg daily


-Initiated 40 mg Lipitor daily


-Continue neurochecks


-Triglycerides 94.7, cholesterol 132, LDL 66, VLDL 18.94, HDL 47.10


-Hemoglobin A1c 5.9


-Review CBC and BMP


-PT, OT, ST on consult


-Cardiology consulted for possible PFO and for YUMIKO; per neurology's 

recommendation patient should be considered for 30-day event monitor


-Per the neurology team, spoke with vascular surgery (Dr. Coles) and she states 

that it is possible of the right ICA is greater than 50% stenosed and if the YUMIKO

is unremarkable, intervention may be considered.





#Syncopy v Mechanical fall


#Debility


-Echocardiogram pending


-Orthostatics pending


-Cardiac monitoring


-On Aricept, monitor for bradycardic episodes - if occurring, consider D/C





#Complicated urinary tract infection (gram-negative negative bacilli)


-WBC 13.6 with associated tenderness of the bladder


-Urinalysis positive with nitrites and leukocyte Estrace


-Urine culture pending


-Patient will continue with Rocephin 1 g IVPB every 24 hours (received 3 days); 

with symptom improvement within 48 hours, consider course of 5-10 days


-Urine culture positive for ESBL MDRO Klebsiella pneumonia, resistant to 

Rocephin, Rocephin discontinued


-Initiate treatment with 1 g ertapenem daily - once symptoms improve (if culture

and susceptibility results allow) switch to an appropriate oral regimen; 

consider treatment course of 5-10 days [first dose of ertapenem given on 

1/30/2025]


-Infectious diseases been consulted


-Continue on NS 20 cc/h


-Monitor CBC, BMP





Chronic conditions:


#Dementia - continue home medications


#Hypothyroidism - Continue home medications


#Chronic kidney disease stage IIIb





GI prophylaxis: Not indicated


DVT prophylaxis: Heparin 5000 units every 12 hours





Dictation was produced using dragon dictation software. please excuse any 

grammatical, word or spelling errors. 





I saw and evaluated the patient during the key and critical portions of this 

encounter, and discussed the case in detail with the resident author of this 

note, I agree with the Assessment and Plan, and my changes, if any, are 

highlighted below.











Objective





- Vital Signs


Vital signs: 


                                   Vital Signs











Temp  97.5 F L  01/29/25 23:30


 


Pulse  93   01/30/25 04:00


 


Resp  20   01/30/25 04:00


 


BP  158/69   01/30/25 04:00


 


Pulse Ox  98   01/30/25 04:00


 


FiO2      








                                 Intake & Output











 01/29/25 01/30/25 01/30/25





 18:59 06:59 18:59


 


Intake Total 875  


 


Output Total 675 1000 


 


Balance 200 -1000 


 


Intake:   


 


    


 


    Sodium Chloride 0.9% 1, 375  





    000 ml @ 75 mls/hr IV .   





    [id number] [facility] Rx#:[id number]   


 


  Oral 500  


 


Output:   


 


  Urine 675 1000 


 


Other:   


 


  Voiding Method External Catheter External Catheter 


 


  # Voids  1 


 


  # Bowel Movements 0 0 














- Labs


CBC & Chem 7: 


                                 01/30/25 05:37





                                 01/30/25 05:37


Labs: 


                  Abnormal Lab Results - Last 24 Hours (Table)











  01/29/25 01/29/25 01/30/25 Range/Units





  05:52 20:36 06:08 


 


MCHC  30.3 L    (31.0-37.0)  g/dL


 


POC Glucose (mg/dL)   149 H  123 H  ()  mg/dL








                      Microbiology - Last 24 Hours (Table)











 01/27/25 13:27 Urine Culture - Final





 Urine,Voided    Klebsiella pneumo ESBL MDRO

## 2025-01-30 NOTE — P.PN
Subjective


Progress Note Date: 01/30/25


I am following-up with patient and she is about the same.  No new neurological 

issues.








Objective





- Vital Signs


Vital signs: 


                                   Vital Signs











Temp  98.1 F   01/30/25 15:54


 


Pulse  68   01/30/25 15:54


 


Resp  20   01/30/25 15:54


 


BP  179/79   01/30/25 15:54


 


Pulse Ox  99   01/30/25 15:54


 


FiO2      








                                 Intake & Output











 01/29/25 01/30/25 01/30/25





 18:59 06:59 18:59


 


Intake Total 875  


 


Output Total 675 1000 


 


Balance 200 -1000 


 


Intake:   


 


    


 


    Sodium Chloride 0.9% 1, 375  





    000 ml @ 75 mls/hr IV .   





    [id number] [facility] Rx#:[id number]   


 


  Oral 500  


 


Output:   


 


  Urine 675 1000 


 


Other:   


 


  Voiding Method External Catheter External Catheter External Catheter


 


  # Voids  1 1


 


  # Bowel Movements 0 0 2














- Exam


General: Lying in bed and is not in acute distress.


HENT: Supple neck.


Neuro:


Limited since patient has underlying dementia with speech difficulty.


Patient is awake alert oriented to self.  She is following simple commands.  

Limited language. 


The pupils are round about 4 mm and reactive to light.  Visual fields are full 

to confrontation.  Extraocular movements intact no nystagmus.  Normal facial 

sensation to touch.  Patient does have mild left lower facial droop.  No 

dysarthria


Tongue is midline moves side-to-side with any difficulty


Motor: Right upper extremity 4. Right lower extremity is 1-2 with multiple 

attempts. Left upper lower extremity is 5 out of 5.


Sensation: She could not tell me if it feels normal to touch but it seems that 

she had normal painful stimuli bilaterally.


Reflexes 2 positive throughout except for the left lower extremity is a 1 

positive.


Plantars are mute.





Some of the work-up during this hospital visit conisted of:


Lipid panel is triglycerides 94, cholesterol is 132, LDL 66 and HDL is 47.


Hemoglobin A1c is 5.9.


CT of the head is reported as similar mild ventriculomegaly likely due to 

central cerebral atrophy.  Correlate with symptoms to exclude component of NPH. 

Mild to moderate patchy border of chronic small vessel ischemic disease.  No 

acute intracranial abnormality seen.  I personally reviewed the CT and agree 

there is no acute or subacute stroke.


CTA neck: Atherosclerotic changes of bilateral carotid bifurcation this results 

moderate 65% proximal right ICA stenosis on the right and moderate just over 50%

proximal left ICA stenosis on the left.  No hemodynamically significant ICA 

stenosis identified.  Anatomic variation with takeoff of the left vertebral 

artery directly from the aortic arch.


CTA head: No large vessel intracranial arterial occlusion, significant stenosis 

or aneurysm changes seen


2D echo: Normal left ventricular size and stock function with mild to moderate 

concentric left ventricular hypertrophy.  Mild right ventricular prominence.  

Moderate dilation of both atria.  There is heavy mitral annular calcification 

with thickening of mitral valve leaflets and some calcific mild stenosis.  

Aortic valve reveals some sclerosis no restriction.  Saline contrast suggested 

possible PFO


Carotid Duplex is reported as some elevation in velocity but with proximal right

ICA indicating moderate 50 to 69% stenosis.  No hemodynamically significant 

internal carotid artery stenosis on either side.


MRI of the brain is reported as acute/subacute ischemic within the posterior 

aspect of the left caudate nucleus body in the left splenium of the corpus 

callosum.


24-hour post IV TNK is reported as moderate patchy burden of chronic small 

vessel ischemic disease.  Hypodensity along the left basal ganglia and splenium 

of the left corpus callosum minimally more pronounced corresponding to the site 

of acute/subacute infarct on MRI earlier today.  No hemorrhagic transformation





- Labs


CBC & Chem 7: 


                                 01/30/25 05:37





                                 01/30/25 05:37


Labs: 


                  Abnormal Lab Results - Last 24 Hours (Table)











  01/29/25 01/30/25 01/30/25 Range/Units





  20:36 05:37 06:08 


 


Chloride   108 H   ()  mmol/L


 


BUN   18 H   (7-17)  mg/dL


 


Creatinine   1.29 H   (0.52-1.04)  mg/dL


 


Glucose   118 H   (74-99)  mg/dL


 


POC Glucose (mg/dL)  149 H   123 H  ()  mg/dL














  01/30/25 Range/Units





  11:26 


 


Chloride   ()  mmol/L


 


BUN   (7-17)  mg/dL


 


Creatinine   (0.52-1.04)  mg/dL


 


Glucose   (74-99)  mg/dL


 


POC Glucose (mg/dL)  132 H  ()  mg/dL








                      Microbiology - Last 24 Hours (Table)











 01/27/25 13:27 Urine Culture - Final





 Urine,Voided    Klebsiella pneumo ESBL MDRO














Assessment and Plan


Assessment: 


This is a 69-year-old woman present emergency department on 01/27/2025 because 

of a fall the night prior.  Seems the patient has recurrent falls.  This hospi

[place] visit seems to the patient has probable acute urinary tract infection.  

Today it was noted the patient had right sided weakness and left facial droop 

upon being evaluated by physical therapy around 10-izzy a.m. and last normal 

reported around 8-izzy a.m. by resident from primary team.  





Acute right sided weakness (predominately right lower >> upper) as well as left 

facial droop is likely acute ischemic stroke status post IV TNK.  Her initial 

NIH stroke scale was a 7 and the repeat was a 5.  MRI Brain reveals 

acute/subacute ischemic within the posterior aspect of the left caudate nucleus 

body in the left splenium of the corpus callosum.  Unknown exact.  No afib or 

flutter.  Has possible PFO on 2D echo.


Right ICA stenosis of about 65% and left ICA of 50% on CT angiography but 

carotid duplex right is 50-69% and that seems asymptomatic.  While on left ICA 

on duplex is no hemodynamically significant stenosis which is discordant on both

imaging.


Acute urinary tract infection


Recurrent falls


Underlying history of dementia and per the resident from primary team he states 

the patient confabulates at baseline


Underlying history of developmentally delayed


Hypothyroidism


Diabetes mellitus





 


Plan: 


Repeat CT head is negative for bleed.


She was resumed on ASA 81mg daily by primary team.  I also added Plavix 75mg 

daily.


Continue Lipitor 40 mg daily.


I consulted cardiology team for possible PFO and for YUMIKO.


Recommend 30 days event monitor.


Continue neurochecks 


Cardiac monitoring


PT OT and SLP are consulted


Consulted vascular surgery team for the carotid stenosis.  I spoke with the 

vascular surgeon (Dr. Coles) team and she stated she think right ICA is greater 

than 50% stenosis and if YUMIKO is unremarkable she would like to consider 

intervention.


Will defer the rest of the medical management to primary and other specialist


For DVT prophylaxis: On subq heparin 5000 unit every 12 hours.





Plan discussed with her daughter who is at bedside, sister via phone and her 

nurse.





Time with Patient: Less than 30

## 2025-01-30 NOTE — US
EXAMINATION TYPE: US kidneys/renal and bladder

 

DATE OF EXAM: 1/30/2025

 

COMPARISON: NONE

 

CLINICAL INDICATION: Female, 69 years old with history of uti and bacteremia; UTI and bacteremia.

 

TECHNIQUE: Grayscale imaging of the bilateral kidneys and urinary bladder:

 

FINDINGS:

 

EXAM MEASUREMENTS:

 

Right Kidney:  10.4 x 5.0 x 4.6 cm

Left Kidney: 9.7 x 4.4 x 4.9 cm

 

 

Right Kidney: No hydronephrosis or masses seen  

Left Kidney: Possible minimal pelvocaliectasis.

 

Bladder: Appears wnl

**Bilateral Jets seen: No

 

**Incidental finding: Incidental gallstone measuring 1.9 cm.

 

 

 

IMPRESSION:

 

1. Right kidney without hydronephrosis.

2. Left kidney with possible minimal pelvicaliectasis which could be transient or could reflect early
 hydronephrosis. Consider short interval follow-up.

3. Incidental 1.9 cm gallstone.

 

 

 

[facility], Workstation: [id number], 1/30/2025 2:45 PM

## 2025-01-30 NOTE — P.PN
Progress Note - Text





Patient admitted with a definite CVA confirmed by follow-up brain CT and with 

MRI


YUMIKO being performed in the presence of sinus rhythm to evaluate the degree of 

mitral stenosis, given the increased thromboembolic risk in moderate and severe 

mitral stenosis 


Valve appears calcific/degenerative on transthoracic echo

## 2025-01-30 NOTE — P.CRDCN
History of Present Illness


Consult date: 01/30/25


Reason for Consult (text): 





YUMIKO, event monitor


History of present illness: 





This is a 69-year-old female with no previous cardiac history.  She has a past 

medical history of dementia, developmentally delayed, hypertension, 

hypothyroidism.  We have been asked to evaluate the patient for YUMIKO and event 

monitor.  Patient presented to the hospital due to right sided weakness and 

facial droop and has been diagnosed with an acute ischemic stroke status post IV

TNK.  There was also concern for possible PFO on echocardiogram.  Patient was 

found to have rites ICA stenosis of 65% and left at 50% on CT angiogram he.  

Vascular surgery is on consult and planning for left TCAR or possible carotid 

endarterectomy on Tuesday.  Blood pressure 179/77, heart rate 77, pulse ox 100% 

on room air.  Patient has been started on aspirin, Lipitor, Plavix.  Patient is 

also on IV antibiotics for UTI.





-EKG: Sinus rhythm with no acute ST-T wave changes.


-Laboratory studies: CBC normal.  BUN 18 and creatinine 1.29.  Triglycerides 94,

cholesterol 132, LDL 66, HDL 47


-Home cardiac medications: Lisinopril 20 mg daily.


-Echocardiogram reveals EF 55 to 60% with moderate concentric LVH.  Heavy 

annular calcification and thickening of the mitral valve leaflets with some 

calcific mild stenosis.  Aortic valve reveals some sclerosis no restriction.  

Mild mitral and tricuspid regurgitation.  No pericardial effusion.  Saline 

contrast suggest possible PFO.





Review Of Systems:


At the time of my exam:


CONSTITUTIONAL: Denies fever or chills.  Right sided weakness


HEENT: Denies blurred vision, vision changes, or eye pain. Denies hemoptysis 


CARDIOVASCULAR: Denies chest pain. Denies orthopnea. Denies PND. Denies 

palpitations


RESPIRATORY: Denies shortness of breath. 


GASTROINTESTINAL: Denies abdominal pain. Denies nausea or vomiting. 


HEMATOLOGIC: Denies bleeding disorders.


GENITOURINARY:  Denies any blood in urine.


SKIN: Denies puritis. Denies rash.





Physical examination:


Gen: This is a 69-year-old female in no acute distress


VS: reviewed


HEENT: Head is atraumatic, normocephalic. Pupils equal, round. Sclerae is 

anicteric. 


NECK: Supple. No JVD. 


LUNGS: Clear to auscultation. No wheezes or rhonchi.  No intercostal 

retractions.


HEART: Regular rate and rhythm. No murmur. 


ABDOMEN: Soft  No tenderness.


EXTREMITIES: No pedal edema.  No calf tenderness.


NEUROLOGICAL: Patient is awake, alert.


 


Assessment:


Abnormal echocardiogram


Embolic stroke


Mitral stenosis dementia


Developmentally delayed


Hypertension


Hypothyroidism





Plan:


Continue current cardiac medications


Schedule patient for YUMIKO Friday with Dr. Mendez to evaluate calcific mitral valve 

and also evaluate for PFO


Further recommendations to follow based upon clinical course


Thank you kindly for this consultation.





Nurse practitioner note has been reviewed, I agree with documented findings and 

plan of care.  Patient was seen and examined.





Past Medical History


Past Medical History: Dementia, Diabetes Mellitus, Eye Disorder, Hypertension, 

Renal Disease, Thyroid Disorder


Additional Past Medical History / Comment(s): blood in urine noticed by daughter

1 year 2024, but never followed up w/dr.


History of Any Multi-Drug Resistant Organisms: ESBL


Date of last positivie culture/infection: 1/27/25


MDRO Source:: urine


Additional Past Surgical History / Comment(s): rt eye catartact removal and 

implant


Past Anesthesia/Blood Transfusion Reactions: No Reported Reaction


Past Psychological History: No Psychological Hx Reported


Smoking Status: Never smoker


Past Alcohol Use History: None Reported


Past Drug Use History: None Reported, Marijuana





Medications and Allergies


                                Home Medications











 Medication  Instructions  Recorded  Confirmed  Type


 


ARIPiprazole [Abilify] 2 mg PO DAILY 01/27/25 01/27/25 History


 


Donepezil [Aricept] 10 mg PO HS 01/27/25 01/27/25 History


 


Ergocalciferol (Vitamin D2) 1,250 mcg PO Q7D 01/27/25 01/27/25 History





[Drisdol (50,000 Iu)]    


 


Levothyroxine Sodium [Synthroid] 25 mcg PO DAILY 01/27/25 01/27/25 History


 


Mirtazapine [Remeron] 15 mg PO HS 01/27/25 01/27/25 History


 


Triamcinolone 0.1% Ointment 1 applic TOPICAL TID 01/27/25 01/27/25 History





[Kenalog 0.1% Ointment]    


 


lisinopriL [Zestril] 20 mg PO DAILY 01/27/25 01/27/25 History


 


metFORMIN HCL [Glucophage] 500 mg PO BID 01/27/25 01/27/25 History


 


ondansetron HCL [Ondansetron HCl] 8 mg PO TID PRN 01/27/25 01/27/25 History








                                    Allergies











Allergy/AdvReac Type Severity Reaction Status Date / Time


 


No Known Allergies Allergy   Verified 01/27/25 16:16














Physical Exam


Vitals: 


                                   Vital Signs











  Temp Pulse Pulse Resp BP BP Pulse Ox


 


 01/30/25 04:00    93  20   158/69  98


 


 01/29/25 23:30  97.5 F L   82  20   148/72  100


 


 01/29/25 20:40  97.4 F L   61  20   160/67  98


 


 01/29/25 20:00  97.9 F   75  20   151/57  96


 


 01/29/25 16:00  97.7 F   76  17   152/90  97


 


 01/29/25 14:00   66   20  164/70   97


 


 01/29/25 13:00   71   9 L  162/67   98


 


 01/29/25 12:00   67   17  143/49   97


 


 01/29/25 11:00   64   13  158/71   94 L


 


 01/29/25 10:51   68   17  158/71   95


 


 01/29/25 09:00   76   11 L  140/94   96


 


 01/29/25 08:30   70   14  138/123   99








                                Intake and Output











 01/29/25 01/30/25 01/30/25





 22:59 06:59 14:59


 


Output Total  1000 


 


Balance  -1000 


 


Output:   


 


  Urine  1000 


 


Other:   


 


  Voiding Method External Catheter External Catheter 


 


  # Voids 1  


 


  # Bowel Movements  0 














Results





                                 01/30/25 05:37





                                 01/30/25 05:37


                                     Lipids











  01/29/25 Range/Units





  05:52 


 


Triglycerides  94.70  (0..00)  mg/dL


 


Cholesterol  132.00  (0..00)  mg/dL


 


HDL Cholesterol  47.10  (40.00-60.00)  mg/dL


 


Cholesterol/HDL Ratio  2.80  Ratio








                                       CBC











  01/30/25 Range/Units





  05:37 


 


WBC  10.4  (3.8-10.6)  k/uL


 


RBC  4.27  (3.80-5.40)  m/uL


 


Hgb  11.9  (11.4-16.0)  gm/dL


 


Hct  38.0  (34.0-46.0)  %


 


Plt Count  223  (150-450)  k/uL








                               Current Medications











Generic Name Dose Route Start Last Admin





  Trade Name Freq  PRN Reason Stop Dose Admin


 


Acetaminophen  650 mg  01/27/25 15:35  01/29/25 14:00





  Acetaminophen Tab 325 Mg Tab  PO   650 mg





  Q6HR PRN   Administration





  Mild Pain or Fever > 100.5  


 


Aripiprazole  2 mg  01/28/25 09:00  01/29/25 08:50





  Aripiprazole 2 Mg Tab  PO   2 mg





  DAILY POOJA   Administration


 


Aspirin  81 mg  01/29/25 10:15  01/29/25 14:00





  Aspirin 81 Mg  PO   81 mg





  DAILY POOJA   Administration


 


Atorvastatin Calcium  40 mg  01/28/25 11:30  01/29/25 08:50





  Atorvastatin 40 Mg Tab  PO   40 mg





  DAILY POOJA   Administration


 


Clopidogrel Bisulfate  75 mg  01/29/25 15:15  01/29/25 16:54





  Clopidogrel 75 Mg Tab  PO   75 mg





  DAILY POOJA   Administration


 


Dextrose/Water  25 ml  01/29/25 09:37 





  Dextrose 50% Syringe 50 Ml  IVP  





  PER PROTOCOL PRN  





  Hypoglycemia  





  Protocol  


 


Dextrose/Water  50 ml  01/29/25 09:37 





  Dextrose 50% Syringe 50 Ml  IVP  





  PER PROTOCOL PRN  





  Hypoglycemia  





  Protocol  


 


Donepezil HCl  10 mg  01/27/25 21:00  01/29/25 20:57





  Donepezil 10 Mg Tab  PO   10 mg





  HS POOJA   Administration


 


Heparin Sodium (Porcine)  5,000 unit  01/29/25 21:00  01/29/25 20:57





  Heparin Sodium,Porcine 5,000 Unit/Ml 1 Ml Vial  SQ   5,000 unit





  Q12HR POOJA   Administration


 


Sodium Chloride  1,000 mls @ 20 mls/hr  01/29/25 10:15  01/29/25 20:45





  Saline 0.9%  IV   Not Given





  .Q24H POOJA  


 


Ertapenem 1 gm/ Sodium  50 mls @ 100 mls/hr  01/30/25 22:00 





  Chloride  IVPB  





  Q24H [facility]  





  Protocol  


 


Insulin Aspart  0 unit  01/29/25 12:30  01/30/25 06:22





  Insulin Aspart (Novolog) 100 Unit/Ml Vial  SQ   Not Given





  [facility]  





  Protocol  


 


Labetalol HCl  20 mg  01/28/25 16:04 





  Labetalol 5 Mg/Ml Vial Mdv  IVP  





  Q10M PRN  





  Blood Pressure - High  


 


Levothyroxine Sodium  25 mcg  01/28/25 06:30  01/30/25 06:45





  Levothyroxine 25 Mcg Tab  PO   25 mcg





  DAILY@0630 POOJA   Administration


 


Mirtazapine  15 mg  01/27/25 21:00  01/29/25 20:57





  Mirtazapine 15 Mg Tab  PO   15 mg





  HS POOJA   Administration


 


Naloxone HCl  0.2 mg  01/27/25 15:35 





  Naloxone 0.4 Mg/Ml 1 Ml Vial  IV  





  Q2M PRN  





  Opioid Reversal  








                                Intake and Output











 01/29/25 01/30/25 01/30/25





 22:59 06:59 14:59


 


Output Total  1000 


 


Balance  -1000 


 


Output:   


 


  Urine  1000 


 


Other:   


 


  Voiding Method External Catheter External Catheter 


 


  # Voids 1  


 


  # Bowel Movements  0 








                                        





                                 01/30/25 05:37 





                                 01/29/25 05:52

## 2025-01-30 NOTE — P.PN
Subjective


Progress Note Date: 01/30/25








This is a 69-year-old female patient was brought into the intensive.


Receiving thrombolytics for new onset right-sided weakness and left facial 

droop.  The patient is not have dementia.  She is known to have diabetes 

mellitus with chronic kidney disease.  She is also known to have hypertension 

hyperlipidemia.  She has been having episodes of falls and earlier this morning,

the patient was noted to have right-sided weakness.  CT scan of the head showed 

mild ventriculomegaly and cerebral atrophy.  Chronic small vessel ischemic 

changes.  CTA of the neck showed 65% proximal right ICA and 50% proximal left 

ICA and the carotid Doppler showed no significant hemodynamically stenosis of 

the carotids.  CTA of the head and neck showed no large vessel intracranial 

arterial occlusion.  Initial NIH score was 7.  The patient was given TNK and 

following that she was admitted to the intensive care unit.  Motor function in 

the right upper extremity is adequate.  There is some ongoing weakness in the 

right lower extremity.  She is awake alert and communicating.  Family at the 

bedside.  No vision changes.  No headaches.  No seizure activity.  She has been 

found to have UTI and currently the patient is on IV Rocephin.  Afebrile and 

hemodynamically stable.  Normal coagulation profile.  Creatinine is at 1.4 

consistent with chronic kidney disease with a GFR of 32.  Hemoglobin is at 12.8.





On 1/29/2025, the patient is being seen for a follow-up.  The patient is status 

post TNK for an acute CVA.  She remains awake and alert.  No headaches.  No 

altered mentation.  Significant weakness in the right lower extremity.  Motor 

function is improved in the right upper extremity which is estimated to be 

around 4 out of 5.  No significant facial asymmetry.  Able to swallow.  A 

follow-up CAT scan of the brain was done today and it shows no evidence of any 

bleeds.  There is moderate patchy burden of chronic small vessel ischemic 

disease along with hypodensity along the left basal ganglia may suggest an 

acute/subacute infarct.  There is also abnormalities in the left corpus 

callosum.  Hemodynamically stable.  No significant hypotension.  The white cell 

count of 9.7, hemoglobin 12.3, platelet count is 219.  Creatinine is 1.3 with a 

BUN of 21.  LDL cholesterol is 66.  HbA1c was at 5.9.





On 1/30/2025, the patient is being seen for a follow-up.  Neurologic status is 

essentially unchanged.  No new onset neurological deficits.  Continues to have 

weakness in the right lower extremity.  Able to swallow.  Able to tolerate diet.

Echocardiogram was also completed and the patient was found to have normal LV, m

oderate LVH, moderate dilatation of both atria, mild mitral tricuspid 

regurgitation, saline contrast suggestive of possible PFO.  MRI of the brain was

also completed and the patient was noted to have an acute/subacute ischemia of 

the posterior aspect of the left caudate nucleus and left splenium corpus 

callosum.  Hemodynamically stable.  No fever.  The patient is currently on room 

air oxygen with a pulse ox of 98%.  Cardiac rhythm is sinus.  WBC count of 10.4,

hemoglobin 11.5 and a platelet count of 223.  BUN is 18 with a creatinine of 1.2

and a sodium level is at 139.  Currently on aspirin and Plavix was also added.  

At the same time, the patient was found to have a Klebsiella pneumonia ESBL 

producing urinary tract infection and the patient is currently on IV Invanz.





Objective





- Vital Signs


Vital signs: 


                                   Vital Signs











Temp  96.7 F L  01/30/25 08:00


 


Pulse  77   01/30/25 08:00


 


Resp  18   01/30/25 08:00


 


BP  179/77   01/30/25 08:00


 


Pulse Ox  100   01/30/25 08:00


 


FiO2      








                                 Intake & Output











 01/29/25 01/30/25 01/30/25





 18:59 06:59 18:59


 


Intake Total 875  


 


Output Total 675 1000 


 


Balance 200 -1000 


 


Intake:   


 


    


 


    Sodium Chloride 0.9% 1, 375  





    000 ml @ 75 mls/hr IV .   





    [id number] [facility] Rx#:[id number]   


 


  Oral 500  


 


Output:   


 


  Urine 675 1000 


 


Other:   


 


  Voiding Method External Catheter External Catheter External Catheter


 


  # Voids  1 


 


  # Bowel Movements 0 0 2














- Exam








General: Lying in bed and is not in acute distress.  Patient has a BMI of 34.7. 

Currently on room air oxygen.


Head exam was generally normal. There was no scleral icterus or corneal arcus. 

Mucous membranes were moist.


Neck was supple and without jugular venous distension, thyromegaly, or carotid 

bruits. Carotids were easily palpable bilaterally. There was no adenopathy.


Lungs were clear to auscultation and percussion, and with normal diaphragmatic 

excursion. No wheezes or rales were noted.


Cardiac exam revealed the PMI to be normally situated and sized. The rhythm was 

regular and no extrasystoles were noted during several minutes of auscultation. 

The first and second heart sounds were normal and physiologic splitting of the 

second heart sound was noted. There were no murmurs, rubs, clicks, or gallops.


Abdominal exam revealed normal bowel sounds. The abdomen was soft, non-tender, 

and without masses, organomegaly, or appreciable enlargement of the abdominal 

aorta.


Examination of the extremities revealed easily palpable radial, femoral and ped

al pulses. There was no cyanosis, clubbing or edema.


Examination of the skin revealed no evidence of significant rashes, suspicious 

appearing nevi or other concerning lesions.


Neurologic examination is limited since patient has underlying dementia.


Patient is awake alert oriented to self.  She is following simple commands.  No 

aphasia from limited language


The pupils are round about 4 mm and reactive to light.  Visual fields are full 

to confrontation.  Extraocular movements intact no nystagmus.  Normal facial 

sensation to touch.  Patient does have mild left lower facial droop.  No 

dysarthria


Tongue is midline moves side-to-side with any difficulty


Motor: Right upper extremity right arm extension patient has about a 3 right 

hand  is 3-4.  Patient does have her right upper extremity drift.  Left arm 

flexion is about a 4 -.  Right lower extremity is 0-1.  Left upper lower 

extremity is 5 out of 5.


Sensation: She could not tell me if it feels normal to touch but it seems that 

she had normal painful stimuli bilaterally.


Reflexes 2 positive throughout except for the left lower extremity is a 1 

positive.





- Labs


CBC & Chem 7: 


                                 01/30/25 05:37





                                 01/30/25 05:37


Labs: 


                  Abnormal Lab Results - Last 24 Hours (Table)











  01/29/25 01/30/25 01/30/25 Range/Units





  20:36 05:37 06:08 


 


Chloride   108 H   ()  mmol/L


 


BUN   18 H   (7-17)  mg/dL


 


Creatinine   1.29 H   (0.52-1.04)  mg/dL


 


Glucose   118 H   (74-99)  mg/dL


 


POC Glucose (mg/dL)  149 H   123 H  ()  mg/dL








                      Microbiology - Last 24 Hours (Table)











 01/27/25 13:27 Urine Culture - Final





 Urine,Voided    Klebsiella pneumo ESBL MDRO














Assessment and Plan


Plan: 





Acute CVA with right-sided weakness and left facial droop, status post TNK 

treatment.  Initial NIH score was 7.  CT scan of the brain showed cerebral 

atrophy and CT angiogram of the brain showed no intracranial vascular ab

normalities and the patient has bilateral carotid artery disease, 65% on the 

right ICA and 50% of the left.  Follow-up CAT scan of the brain showed no acute 

hemorrhage.  Changes in the left corpus callosum and basal ganglia indicating 

acute/subacute infarct.  Neurologically unchanged compared to yesterday.  The 

patient is currently on a combination of aspirin and Plavix.  Will need a YUMIKO 

regarding embolic disease contributing to this acute CVA.  Transthoracic 

echocardiogram suggested the possibility of a patent PFO.  MRI of the brain 

showed a subacute CVA of the posterior aspect of the left caudate nucleus and 

left splenium corpus callosum.





Bilateral carotid artery disease





Dementia





Diabetes mellitus type 2





Chronic kidney disease





Hypothyroidism





Hypertension





UTI, secondary to Klebsiella pneumonia, ESBL producing





Plan





Monitor neurological functions 


Clinically stable


Continue aspirin and Plavix


MRI of the brain was completed consistent with CVA/subacute involving  the 

posterior aspect of the left caudate nucleus and left splenium corpus callosum.


Neurology consultation is appreciated


YUMIKO is recommended


Cardiac rhythm is sinus


Cardiac monitor


Vascular consultation


Continue IV Invanz


M will continue to follow

## 2025-01-30 NOTE — P.PN
Subjective


Progress Note Date: 01/30/25


Principal diagnosis: 





Carotid stenosis





Patient seen and examined today as a follow-up.  Brain MRI did show 

acute/subacute ischemia within the posterior aspect of the left caudate nucleus 

body and left splenium of the corpus callosum.  She had a repeat CT of the brain

as well with the no new findings and no hemorrhage.  She remains on aspirin and 

Plavix.  She continues to have right sided weakness.  No new focal deficits 

reported.  Lower extremity venous duplex negative for bilateral lower extremity 

DVTs.  Cardiology on consult for cardiology workup and surgical clearance.





Objective





- Vital Signs


Vital signs: 


                                   Vital Signs











Temp  97.5 F L  01/29/25 23:30


 


Pulse  93   01/30/25 04:00


 


Resp  20   01/30/25 04:00


 


BP  158/69   01/30/25 04:00


 


Pulse Ox  98   01/30/25 04:00


 


FiO2      








                                 Intake & Output











 01/29/25 01/30/25 01/30/25





 18:59 06:59 18:59


 


Intake Total 875  


 


Output Total 675 1000 


 


Balance 200 -1000 


 


Intake:   


 


    


 


    Sodium Chloride 0.9% 1, 375  





    000 ml @ 75 mls/hr IV .   





    [id number] [facility] Rx#:[id number]   


 


  Oral 500  


 


Output:   


 


  Urine 675 1000 


 


Other:   


 


  Voiding Method External Catheter External Catheter 


 


  # Voids  1 


 


  # Bowel Movements 0 0 2














- Exam





General appearance: The patient is alert, oriented, appears in no acute 

distress.  Obese.


HET: Head is normocephalic and atraumatic.  Pupils are equal and reactive.  


Neck: Supple.  No audible bruit.


Heart: Regular.


Lungs: Equal expansion, normal respiratory effort.


Abdomen: Soft, nontender, nondistended.


Extremities: Normal skin color and turgor.  Palpable bilateral radial and DP 

pulses.


Neurological: Patient is alert and oriented.  Speech is fluent, answers 

questions appropriately.  Right sided upper and lower extremity weakness.  

Appears to have a left facial droop with smile. 








- Labs


CBC & Chem 7: 


                                 01/30/25 05:37





                                 01/30/25 05:37


Labs: 


                  Abnormal Lab Results - Last 24 Hours (Table)











  01/29/25 01/30/25 Range/Units





  20:36 06:08 


 


POC Glucose (mg/dL)  149 H  123 H  ()  mg/dL








                      Microbiology - Last 24 Hours (Table)











 01/27/25 13:27 Urine Culture - Final





 Urine,Voided    Klebsiella pneumo ESBL MDRO














Assessment and Plan


Assessment: 





1.  Symptomatic left ICA stenosis, greater than 50%


2.  Acute/subacute ischemic stroke within the posterior aspect of left caudate 

nucleus body and left splenium of the corpus callosum with symptoms of right 

sided weakness


3.  Possible PFO reported on echocardiogram


4.  Recent fall


5.  Urinary tract infection


6.  Hypertension


7.  Dementia


Plan: 





1.  Carotid duplex ordered and reviewed


2.  Neurology on consultation, appreciate their recommendations


3.  Patient is status post T.enecteplase


4.  PT, OT, ST on consult


5.  Cardiology on consultation to evaluate for PFO, will need cardiac clearance 

for left TCAR, possible carotid endarterectomy


6.  Lower extremity venous duplex negative for DVT


6.  Further recommendations forthcoming based on clinical course


Thank you for this consultation, we will follow along.








The impression and plan of care has been dictated as directed.





Dr. Ailyn Bauman performed a history and examination of this patient,  discussed the same with 

the dictator.  I agree with the dictator's note ,documented as a scribe.  Any 

additional findings or plans will be noted.

## 2025-01-31 LAB
ANION GAP SERPL CALC-SCNC: 8 MMOL/L
BASOPHILS # BLD AUTO: 0 K/UL (ref 0–0.2)
BASOPHILS NFR BLD AUTO: 0 %
BUN SERPL-SCNC: 21 MG/DL (ref 7–17)
CALCIUM SPEC-MCNC: 9.1 MG/DL (ref 8.4–10.2)
CHLORIDE SERPL-SCNC: 104 MMOL/L (ref 98–107)
CO2 SERPL-SCNC: 27 MMOL/L (ref 22–30)
EOSINOPHIL # BLD AUTO: 0.5 K/UL (ref 0–0.7)
EOSINOPHIL NFR BLD AUTO: 6 %
ERYTHROCYTE [DISTWIDTH] IN BLOOD BY AUTOMATED COUNT: 4.28 M/UL (ref 3.8–5.4)
ERYTHROCYTE [DISTWIDTH] IN BLOOD: 13.6 % (ref 11.5–15.5)
GLUCOSE BLD-MCNC: 115 MG/DL (ref 70–110)
GLUCOSE BLD-MCNC: 126 MG/DL (ref 70–110)
GLUCOSE BLD-MCNC: 186 MG/DL (ref 70–110)
GLUCOSE BLD-MCNC: 207 MG/DL (ref 70–110)
GLUCOSE SERPL-MCNC: 105 MG/DL (ref 74–99)
HCT VFR BLD AUTO: 38.2 % (ref 34–46)
HGB BLD-MCNC: 11.8 GM/DL (ref 11.4–16)
LYMPHOCYTES # SPEC AUTO: 1.9 K/UL (ref 1–4.8)
LYMPHOCYTES NFR SPEC AUTO: 24 %
MCH RBC QN AUTO: 27.6 PG (ref 25–35)
MCHC RBC AUTO-ENTMCNC: 30.8 G/DL (ref 31–37)
MCV RBC AUTO: 89.4 FL (ref 80–100)
MONOCYTES # BLD AUTO: 0.5 K/UL (ref 0–1)
MONOCYTES NFR BLD AUTO: 6 %
NEUTROPHILS # BLD AUTO: 5.1 K/UL (ref 1.3–7.7)
NEUTROPHILS NFR BLD AUTO: 63 %
PLATELET # BLD AUTO: 248 K/UL (ref 150–450)
POTASSIUM SERPL-SCNC: 4.9 MMOL/L (ref 3.5–5.1)
SODIUM SERPL-SCNC: 139 MMOL/L (ref 137–145)
WBC # BLD AUTO: 8.1 K/UL (ref 3.8–10.6)

## 2025-01-31 PROCEDURE — B246ZZ4 ULTRASONOGRAPHY OF RIGHT AND LEFT HEART, TRANSESOPHAGEAL: ICD-10-PCS

## 2025-01-31 RX ADMIN — LABETALOL HYDROCHLORIDE PRN MG: 5 INJECTION, SOLUTION INTRAVENOUS at 08:06

## 2025-01-31 RX ADMIN — FENTANYL CITRATE ONE MCG: 50 INJECTION, SOLUTION INTRAMUSCULAR; INTRAVENOUS at 13:32

## 2025-01-31 RX ADMIN — MIDAZOLAM ONE MG: 1 INJECTION INTRAMUSCULAR; INTRAVENOUS at 13:32

## 2025-01-31 RX ADMIN — BENZOCAINE ONE SPRAY: 200 SPRAY DENTAL; ORAL; PERIODONTAL at 13:30

## 2025-01-31 RX ADMIN — NICARDIPINE HYDROCHLORIDE ONE MLS: 2.5 INJECTION INTRAVENOUS at 13:27

## 2025-01-31 NOTE — P.PN
Subjective


Progress Note Date: 01/31/25





Reason for Consult (text): 





YUMIKO, event monitor


History of present illness: 





This is a 69-year-old female with no previous cardiac history.  She has a past 

medical history of dementia, developmentally delayed, hypertension, 

hypothyroidism.  We have been asked to evaluate the patient for YUMIKO and event 

monitor.  Patient presented to the hospital due to right sided weakness and 

facial droop and has been diagnosed with an acute ischemic stroke status post IV

TNK.  There was also concern for possible PFO on echocardiogram.  Patient was 

found to have rites ICA stenosis of 65% and left at 50% on CT angiogram he.  

Vascular surgery is on consult and planning for left TCAR or possible carotid 

endarterectomy on Tuesday.  Blood pressure 179/77, heart rate 77, pulse ox 100% 

on room air.  Patient has been started on aspirin, Lipitor, Plavix.  Patient is 

also on IV antibiotics for UTI.





-EKG: Sinus rhythm with no acute ST-T wave changes.


-Laboratory studies: CBC normal.  BUN 18 and creatinine 1.29.  Triglycerides 94,

cholesterol 132, LDL 66, HDL 47


-Home cardiac medications: Lisinopril 20 mg daily.


-Echocardiogram reveals EF 55 to 60% with moderate concentric LVH.  Heavy 

annular calcification and thickening of the mitral valve leaflets with some calc

ific mild stenosis.  Aortic valve reveals some sclerosis no restriction.  Mild 

mitral and tricuspid regurgitation.  No pericardial effusion.  Saline contrast 

suggest possible PFO.





1/31


Patient is scheduled for YUMIKO today with Dr. Mendez to evaluate mitral valve.  

Blood pressure readings remain elevated at 169/70, heart rate 76, pulse ox 99% 

on room air.  Repeat blood work reveals potassium 4.9, BUN 21 creatinine 1.47.





Physical examination:


Gen: This is a 69-year-old female in no acute distress


VS: reviewed


HEENT: Head is atraumatic, normocephalic. Pupils equal, round. Sclerae is 

anicteric. 


NECK: Supple. No JVD. 


LUNGS: Clear to auscultation. No wheezes or rhonchi.  No intercostal retractions

.


HEART: Regular rate and rhythm. No murmur. 


ABDOMEN: Soft  No tenderness.


EXTREMITIES: No pedal edema.  No calf tenderness.


NEUROLOGICAL: Patient is awake, alert.


 


Assessment:


Abnormal echocardiogram


Embolic stroke


Mitral stenosis dementia


Developmentally delayed


Hypertension


Hypothyroidism





Plan:


Continue current cardiac medications


Add amlodipine 2.5 mg daily


Discontinue as needed hydralazine


Schedule patient for YUMIKO today with Dr. Mendez to evaluate calcific mitral valve 

and also evaluate for PFO


Further recommendations to follow based upon clinical course





Nurse practitioner note has been reviewed, I agree with documented findings and 

plan of care.  Patient was seen and examined.








Objective





- Vital Signs


Vital signs: 


                                   Vital Signs











Temp  97.7 F   01/31/25 07:53


 


Pulse  76   01/31/25 07:53


 


Resp  18   01/31/25 07:53


 


BP  169/70   01/31/25 08:30


 


Pulse Ox  99   01/31/25 07:53


 


FiO2      








                                 Intake & Output











 01/30/25 01/31/25 01/31/25





 18:59 06:59 18:59


 


Intake Total 240 10 


 


Output Total  800 


 


Balance 240 -790 


 


Weight  107.5 kg 


 


Intake:   


 


  IV  10 


 


    Invasive Line 3  10 


 


  Oral 240  


 


Output:   


 


  Urine  800 


 


Other:   


 


  Voiding Method External Catheter External Catheter 


 


  # Voids 1  


 


  # Bowel Movements 2 1 














- Labs


CBC & Chem 7: 


                                 01/31/25 05:35





                                 01/31/25 05:35


Labs: 


                  Abnormal Lab Results - Last 24 Hours (Table)











  01/30/25 01/30/25 01/30/25 Range/Units





  11:26 16:17 20:19 


 


MCHC     (31.0-37.0)  g/dL


 


BUN     (7-17)  mg/dL


 


Creatinine     (0.52-1.04)  mg/dL


 


Glucose     (74-99)  mg/dL


 


POC Glucose (mg/dL)  132 H  131 H  140 H  ()  mg/dL














  01/31/25 01/31/25 01/31/25 Range/Units





  05:35 05:35 05:55 


 


MCHC  30.8 L    (31.0-37.0)  g/dL


 


BUN   21 H   (7-17)  mg/dL


 


Creatinine   1.47 H   (0.52-1.04)  mg/dL


 


Glucose   105 H   (74-99)  mg/dL


 


POC Glucose (mg/dL)    126 H  ()  mg/dL

## 2025-01-31 NOTE — P.PN
Subjective


Progress Note Date: 01/31/25








This is a 69-year-old female patient was brought into the intensive.


Receiving thrombolytics for new onset right-sided weakness and left facial 

droop.  The patient is not have dementia.  She is known to have diabetes 

mellitus with chronic kidney disease.  She is also known to have hypertension 

hyperlipidemia.  She has been having episodes of falls and earlier this morning,

the patient was noted to have right-sided weakness.  CT scan of the head showed 

mild ventriculomegaly and cerebral atrophy.  Chronic small vessel ischemic 

changes.  CTA of the neck showed 65% proximal right ICA and 50% proximal left 

ICA and the carotid Doppler showed no significant hemodynamically stenosis of 

the carotids.  CTA of the head and neck showed no large vessel intracranial 

arterial occlusion.  Initial NIH score was 7.  The patient was given TNK and 

following that she was admitted to the intensive care unit.  Motor function in 

the right upper extremity is adequate.  There is some ongoing weakness in the 

right lower extremity.  She is awake alert and communicating.  Family at the 

bedside.  No vision changes.  No headaches.  No seizure activity.  She has been 

found to have UTI and currently the patient is on IV Rocephin.  Afebrile and 

hemodynamically stable.  Normal coagulation profile.  Creatinine is at 1.4 

consistent with chronic kidney disease with a GFR of 32.  Hemoglobin is at 12.8.





On 1/29/2025, the patient is being seen for a follow-up.  The patient is status 

post TNK for an acute CVA.  She remains awake and alert.  No headaches.  No 

altered mentation.  Significant weakness in the right lower extremity.  Motor 

function is improved in the right upper extremity which is estimated to be 

around 4 out of 5.  No significant facial asymmetry.  Able to swallow.  A 

follow-up CAT scan of the brain was done today and it shows no evidence of any 

bleeds.  There is moderate patchy burden of chronic small vessel ischemic 

disease along with hypodensity along the left basal ganglia may suggest an 

acute/subacute infarct.  There is also abnormalities in the left corpus 

callosum.  Hemodynamically stable.  No significant hypotension.  The white cell 

count of 9.7, hemoglobin 12.3, platelet count is 219.  Creatinine is 1.3 with a 

BUN of 21.  LDL cholesterol is 66.  HbA1c was at 5.9.





On 1/30/2025, the patient is being seen for a follow-up.  Neurologic status is 

essentially unchanged.  No new onset neurological deficits.  Continues to have 

weakness in the right lower extremity.  Able to swallow.  Able to tolerate diet.

Echocardiogram was also completed and the patient was found to have normal LV, m

oderate LVH, moderate dilatation of both atria, mild mitral tricuspid 

regurgitation, saline contrast suggestive of possible PFO.  MRI of the brain was

also completed and the patient was noted to have an acute/subacute ischemia of 

the posterior aspect of the left caudate nucleus and left splenium corpus 

callosum.  Hemodynamically stable.  No fever.  The patient is currently on room 

air oxygen with a pulse ox of 98%.  Cardiac rhythm is sinus.  WBC count of 10.4,

hemoglobin 11.5 and a platelet count of 223.  BUN is 18 with a creatinine of 1.2

and a sodium level is at 139.  Currently on aspirin and Plavix was also added.  

At the same time, the patient was found to have a Klebsiella pneumonia ESBL 

producing urinary tract infection and the patient is currently on IV Invanz.








1/31/2025, patient is being seen for a follow-up.  Neurologically, some modest 

improvement in the right lower extremity motor function.  Still unable to raise 

her leg against gravity.  Hemodynamically stable.  Still on a cardiac rhythm 

that is sinus.  YUMIKO was done.  There is no evidence of ASD or PFO.  There is 

degenerative mitral valve annular calcification.  There is mild mitral stenosis 

and moderate bilateral atrial dilatation and normal LV function.  No evidence of

any intracardiac thrombus.  The patient remains on a combination of aspirin and 

Plavix.  Remains on IV Invanz for a ESBL producing urine tract infection.  I 

will discuss today 0.1, hemoglobin 11.8 and a platelet count of 248.  BUN is 21 

with a creatinine of 1.47 and sodium levels at 139 with a potassium level of 

4.9.





Objective





- Vital Signs


Vital signs: 


                                   Vital Signs











Temp  97.9 F   01/31/25 11:28


 


Pulse  63   01/31/25 11:28


 


Resp  20   01/31/25 11:28


 


BP  142/63   01/31/25 11:28


 


Pulse Ox  99   01/31/25 11:28


 


FiO2      








                                 Intake & Output











 01/30/25 01/31/25 01/31/25





 18:59 06:59 18:59


 


Intake Total 240 10 118


 


Output Total  800 


 


Balance 240 -790 118


 


Weight  107.5 kg 


 


Intake:   


 


  IV  10 


 


    Invasive Line 3  10 


 


  Oral 240  118


 


Output:   


 


  Urine  800 


 


Other:   


 


  Voiding Method External Catheter External Catheter 


 


  # Voids 1  


 


  # Bowel Movements 2 1 














- Exam








General: Lying in bed and is not in acute distress.  Patient has a BMI of 34.7. 

Currently on room air oxygen.


Head exam was generally normal. There was no scleral icterus or corneal arcus. 

Mucous membranes were moist.


Neck was supple and without jugular venous distension, thyromegaly, or carotid 

bruits. Carotids were easily palpable bilaterally. There was no adenopathy.


Lungs were clear to auscultation and percussion, and with normal diaphragmatic 

excursion. No wheezes or rales were noted.


Cardiac exam revealed the PMI to be normally situated and sized. The rhythm was 

regular and no extrasystoles were noted during several minutes of auscultation. 

The first and second heart sounds were normal and physiologic splitting of the s

econd heart sound was noted. There were no murmurs, rubs, clicks, or gallops.


Abdominal exam revealed normal bowel sounds. The abdomen was soft, non-tender, 

and without masses, organomegaly, or appreciable enlargement of the abdominal 

aorta.


Examination of the extremities revealed easily palpable radial, femoral and 

pedal pulses. There was no cyanosis, clubbing or edema.


Examination of the skin revealed no evidence of significant rashes, suspicious 

appearing nevi or other concerning lesions.


Neurologic examination is limited since patient has underlying dementia.


Patient is awake alert oriented to self.  She is following simple commands.  No 

aphasia from limited language


The pupils are round about 4 mm and reactive to light.  Visual fields are full 

to confrontation.  Extraocular movements intact no nystagmus.  Normal facial 

sensation to touch.  Patient does have mild left lower facial droop.  No 

dysarthria


Tongue is midline moves side-to-side with any difficulty


Motor: Right upper extremity right arm extension patient has about a 3 right 

hand  is 3-4.  Patient does have her right upper extremity drift.  Left arm 

flexion is about a 4 -.  Right lower extremity is 0-1.  Left upper lower 

extremity is 5 out of 5.


Sensation: She could not tell me if it feels normal to touch but it seems that 

she had normal painful stimuli bilaterally.


Reflexes 2 positive throughout except for the left lower extremity is a 1 

positive.





- Labs


CBC & Chem 7: 


                                 01/31/25 05:35





                                 01/31/25 05:35


Labs: 


                  Abnormal Lab Results - Last 24 Hours (Table)











  01/30/25 01/30/25 01/31/25 Range/Units





  16:17 20:19 05:35 


 


MCHC    30.8 L  (31.0-37.0)  g/dL


 


BUN     (7-17)  mg/dL


 


Creatinine     (0.52-1.04)  mg/dL


 


Glucose     (74-99)  mg/dL


 


POC Glucose (mg/dL)  131 H  140 H   ()  mg/dL














  01/31/25 01/31/25 01/31/25 Range/Units





  05:35 05:55 11:34 


 


MCHC     (31.0-37.0)  g/dL


 


BUN  21 H    (7-17)  mg/dL


 


Creatinine  1.47 H    (0.52-1.04)  mg/dL


 


Glucose  105 H    (74-99)  mg/dL


 


POC Glucose (mg/dL)   126 H  115 H  ()  mg/dL














Assessment and Plan


Plan: 





Acute CVA with right-sided weakness and left facial droop, status post TNK 

treatment.  Initial NIH score was 7.  CT scan of the brain showed cerebral 

atrophy and CT angiogram of the brain showed no intracranial vascular 

abnormalities and the patient has bilateral carotid artery disease, 65% on the 

right ICA and 50% of the left.  Follow-up CAT scan of the brain showed no acute 

hemorrhage.  Changes in the left corpus callosum and basal ganglia indicating 

acute/subacute infarct.  Neurologically unchanged compared to yesterday.  The 

patient is currently on a combination of aspirin and Plavix.   YUMIKO showed no 

evidence of any ASD, VSD or PFO.  Preserved LV function.  No evidence of any 

intracardiac thrombus.  MRI of the brain showed a subacute CVA of the posterior 

aspect of the left caudate nucleus and left splenium corpus callosum.  The 

patient remains on a combination of aspirin and Plavix.





Bilateral carotid artery disease





Dementia





Diabetes mellitus type 2





Chronic kidney disease





Hypothyroidism





Hypertension





UTI, secondary to Klebsiella pneumonia, ESBL producing





Plan





Monitor neurological functions 


Clinically stable


Continue aspirin and Plavix


MRI of the brain was completed consistent with CVA/subacute involving  the 

posterior aspect of the left caudate nucleus and left splenium corpus callosum.


Neurology consultation is appreciated


YUMIKO was noted


Cardiac rhythm is sinus


Cardiac monitor


Vascular consultation


Continue IV Invanz


M will continue to follow

## 2025-01-31 NOTE — P.CONS
History of Present Illness





- Reason for Consult


Consult date: 01/30/25


MDRO Klebsiella UTI


Requesting physician: Yecenia Wolfe





- Chief Complaint


Weakness X few days





- History of Present Illness





Patient is a 69-year-old  female with a past medical history 

significant for diabetes mellitus hypertension renal disease and dementia has 

been brought to the hospital 3 days ago for evaluation of fall and the patient 

was unable to get up when the family member went to check on her she was found 

on the ground patient was complaining of some left foot pain on arrival to the 

ER patient did have a low-grade fever of 99 F no high-grade fever have been 

recorded patient was nontachycardic hypotensive or hypoxic and no need for 

supplemental oxygen she did have elevated white count of 13.6 on admission with 

a left shift awaiting creatinine has been elevated white count subsequent 

normalized he did have a positive UA with large leukocyte esterase moderate WBC 

culture has been finalized with ESBL Klebsiella antibiotic was switched to In

[facility] infectious disease was consulted for further management of antibiotic 

therapy most information has been extracted from review the chart as patient 

herself not a very good historian though denies having any chest pain shortness 

of breath or cough no abdominal pain no diarrhea





Review of Systems





Positive point and negatives has been  mentioned in the HPI, complete review of 

systems was performed and all other systems are negative





Past Medical History


Past Medical History: Dementia, Diabetes Mellitus, Eye Disorder, Hypertension, 

Renal Disease, Thyroid Disorder


Additional Past Medical History / Comment(s): blood in urine noticed by daughter

1 year 2024, but never followed up w/


History of Any Multi-Drug Resistant Organisms: ESBL


Year Discovered:: 1/27/25


MDRO Source:: urine


Additional Past Surgical History / Comment(s): rt eye catartact removal and 

implant


Past Anesthesia/Blood Transfusion Reactions: No Reported Reaction


Past Psychological History: No Psychological Hx Reported


Smoking Status: Never smoker


Past Alcohol Use History: None Reported


Past Drug Use History: None Reported, Marijuana





Medications and Allergies


                                Home Medications











 Medication  Instructions  Recorded  Confirmed  Type


 


ARIPiprazole [Abilify] 2 mg PO DAILY 01/27/25 01/27/25 History


 


Donepezil [Aricept] 10 mg PO HS 01/27/25 01/27/25 History


 


Ergocalciferol (Vitamin D2) 1,250 mcg PO Q7D 01/27/25 01/27/25 History





[Drisdol (50,000 Iu)]    


 


Levothyroxine Sodium [Synthroid] 25 mcg PO DAILY 01/27/25 01/27/25 History


 


Mirtazapine [Remeron] 15 mg PO HS 01/27/25 01/27/25 History


 


Triamcinolone 0.1% Ointment 1 applic TOPICAL TID 01/27/25 01/27/25 History





[Kenalog 0.1% Ointment]    


 


lisinopriL [Zestril] 20 mg PO DAILY 01/27/25 01/27/25 History


 


metFORMIN HCL [Glucophage] 500 mg PO BID 01/27/25 01/27/25 History


 


ondansetron HCL [Ondansetron HCl] 8 mg PO TID PRN 01/27/25 01/27/25 History








                                    Allergies











Allergy/AdvReac Type Severity Reaction Status Date / Time


 


No Known Allergies Allergy   Verified 01/27/25 16:16














Physical Exam


Vitals: 


                                   Vital Signs











  Temp Pulse Pulse Resp BP BP Pulse Ox


 


 01/30/25 08:00  96.7 F L   77  18   179/77  100


 


 01/30/25 04:00    93  20   158/69  98


 


 01/29/25 23:30  97.5 F L   82  20   148/72  100


 


 01/29/25 20:40  97.4 F L   61  20   160/67  98


 


 01/29/25 20:00  97.9 F   75  20   151/57  96


 


 01/29/25 16:00  97.7 F   76  17   152/90  97


 


 01/29/25 14:00   66   20  164/70   97


 


 01/29/25 13:00   71   9 L  162/67   98


 


 01/29/25 12:00   67   17  143/49   97


 


 01/29/25 11:00   64   13  158/71   94 L








                                Intake and Output











 01/29/25 01/30/25 01/30/25





 22:59 06:59 14:59


 


Output Total  1000 


 


Balance  -1000 


 


Output:   


 


  Urine  1000 


 


Other:   


 


  Voiding Method External Catheter External Catheter External Catheter


 


  # Voids 1  


 


  # Bowel Movements  0 2














GENERAL DESCRIPTION: Elderly female lying in bed, no distress. No tachypnea or 

accessory muscle of respiration use.


HEENT: Shows Pallor , no scleral icterus. Oral mucous membrane is dry. No 

pharyngeal erythema or thrush


NECK: Trachea central, no thyromegaly.


LUNGS: Unlabored breathing. Clear to auscultation anteriorly. No wheeze or 

crackle.


HEART: S1, S2, regular rate and rhythm. No loud murmur


ABDOMEN: Soft, no tenderness , guarding or rigidity, no organomegaly


EXTREMITIES: No edema of feet.


SKIN: No rash, no masses palpable.


NEUROLOGICAL: The patient is awake, mood and affect normal.














Results


CBC & Chem 7: 


                                 01/31/25 05:35





                                 01/31/25 05:35


Labs: 


                  Abnormal Lab Results - Last 24 Hours (Table)











  01/29/25 01/30/25 01/30/25 Range/Units





  20:36 05:37 06:08 


 


Chloride   108 H   ()  mmol/L


 


BUN   18 H   (7-17)  mg/dL


 


Creatinine   1.29 H   (0.52-1.04)  mg/dL


 


Glucose   118 H   (74-99)  mg/dL


 


POC Glucose (mg/dL)  149 H   123 H  ()  mg/dL








                      Microbiology - Last 24 Hours (Table)











 01/27/25 13:27 Urine Culture - Final





 Urine,Voided    Klebsiella pneumo ESBL MDRO














Assessment and Plan


(1) Infection with ESBL Klebsiella oxytoca


Current Visit: Yes   Status: Acute   Code(s): A49.8 - OTHER BACTERIAL INFECTIONS

OF UNSPECIFIED SITE; Z16.12 - EXTENDED SPECTRUM BETA LACTAMASE (ESBL) RESISTANCE

  SNOMED Code(s): 6428663774


   





(2) Urinary tract infection


Current Visit: Yes   Status: Acute   Code(s): N39.0 - URINARY TRACT INFECTION, 

SITE NOT SPECIFIED   SNOMED Code(s): 59256330


   


Plan: 





1patient presented hospital with weakness fall did have some mental status 

changes elevated white count positive UA concerning for symptomatic UTI 

contributing to her symptoms urine positive for ESBL Klebsiella.


2we will check a ultrasound of the kidney bladder to make sure no evidence of 

any hydronephrosis or structural abnormality.


3Invanz 1 g daily patient will likely need midline and short course of IV 

antibiotic on discharge.


We will follow on clinical condition and cultures to further adjust medication 

if needed


Thank you for this consultation we will follow the patient along with you


Dictation was produced using dragon dictation software. please excuse any 

grammatical, word or spelling errors. 








Time with Patient: Greater than 30

## 2025-01-31 NOTE — P.PN
Subjective


Progress Note Date: 01/31/25


History of present illness;


Patient is a 69-year-old female with a past medical history significant for syed

betes mellitus, thyroid disorder.  She presented to the emergency department 

after falling at home.  She states that she has frequent falls, with the most 

recent one being last night which brought her to the emergency department today.

 She states that she was in her apartment and attempting to clean her apartment,

at that time she states that she lost her footing and fell on her left side.  

She states that she did not hit her head, however she does attest to having felt

some dizziness prior to falling.  She was found on the ground by her son-in-law,

and she is unsure of how long she was on the ground for but believes it to be at

least 2 hours stating that she was too weak to get herself up.  She has no acute

pain anywhere on her body, however notes some generalized soreness in her lower 

extremities and her right shoulder.  She lives alone and states that she is able

to handle most activities of daily living by herself.  She states that she has 

not taken any anticoagulation medications.





1/28/25 - Patient seen and examined at bedside.  She had no events overnight, 

stated she was feeling better.  With no acute complaints.





Was contacted by the nurse at approximately 10:45 AM noting there was new onset 

weakness in the patient's right side, asking if this was new compared to yesterd

ay when the patient was previously seen.  Went down and saw the patient in the 

observation unit to assess, her right arm was in decerebrate positioning and 

noted she had an inability to move right lower extremity, drift when extending 

her right upper extremity.  Additionally, patient was noted to have left-sided 

facial droop, as well as left-sided deviation of the tongue when protruding.  

During this time she was able to fully converse and answer questions 

appropriately, although maintaining her circumstantial speech as well as 

confabulation.  At that time code stroke was called.





1/29/25 - Patient seen and examined at bedside this morning, remaining in the 

ICU for close monitoring.  She states she is feeling better than yesterday.  

Notes increased ability to move her right upper and lower extremity.  It has 

been noted by the nursing staff that she has intermittent moments of the ability

to have function of her right lower extremity.  She has no acute complaints at 

this time and states that the suprapubic tenderness she had been experiencing 

has resolved.





1/30/25 - Patient seen and examined at bedside this morning, she was moved out 

of the ICU yesterday afternoon and is now on cardiac step down, 3 south.  States

she is feeling better this morning than she had been.  Has no acute complaints 

at this time.





1/31/25 - Patient seen and examined at bedside this morning.  States she is 

feeling well.  No new changes in the functional status for right lower extremity

as it is about the same as yesterday.  She is scheduled to undergo a YUMIKO this 

morning to determine if any further steps are to be taken with vascular surgery.

 She has no acute complaints at this time.





PHYSICAL EXAMINATION: 


GENERAL: The patient is alert and oriented x3, with circumstantial speech with 

confabulation


HEENT: Pupils are round and equally reacting to light. EOMI. No scleral icterus.

No conjunctival pallor.


CARDIOVASCULAR: S1 and S2 present. No murmurs, rubs, or gallops. 


PULMONARY: Chest is clear to auscultation, no wheezing or crackles. 


ABDOMEN: Soft, nontender, nondistended, normoactive bowel sounds. No palpable 

organomegaly. 


MUSCULOSKELETAL: No joint swelling or deformity.


EXTREMITIES: No cyanosis, clubbing, or pedal edema. 


NEUROLOGICAL: Some mobility of the right lower extremity restored, able to 

wiggle her toes and move her foot at the ankle and plantar and dorsi flexion; 

drift with extension of right upper extremity; right upper extremity noted to be

into 3 repositioning;  strength on the right side improved as compared to 

yesterday; left sided facial droop and tongue deviation 


SKIN: No rashes.





New data today:


Labs: WBCs 8.1, hemoglobin 11.8, hematocrit 38.2, platelet 248; sodium 139, 

potassium 4.9, BUN 21, creatinine 1.47, calcium 9.1


Imaging: Abdomen/bladder ultrasound showed right kidney without hydronephrosis; 

left kidney with possible minimal pelvicaliectasis which could be transient 

however could reflect early hydronephrosis (consider short interval follow-up); 

incidental 1.9 cm gallstone





Assessment and plan


69-year-old female with PMH of diabetes mellitus, thyroid disorder.  Presents 

emergency department following a fall at home in which she notes to having some 

dizziness prior to falling.





#New onset right-sided weakness


#History of previous stroke ~30 years ago


-Last known normal ~7:30-8:00 am


-~10:45 a.m. patient was noted to have weakness of her right lower extremity


-Code stroke called


-CT brain completed, read and reviewed


-CT angiography head/neck read and reviewed


-Carotid Doppler read and reviewed


-MRI brain w/o contrast completed, read and reviewed


-Repeat CT brain (1/29/2025) completed, read and reviewed


-Patient received TNK 24.5 mg IV push once


-Initiated aspirin 325 mg once this morning (1/28/2025)


-Continue with aspirin 81 mg daily


-Per neurology recommendation patient started on Plavix 75 mg daily


-Initiated 40 mg Lipitor daily


-Continue neurochecks


-Triglycerides 94.7, cholesterol 132, LDL 66, VLDL 18.94, HDL 47.10


-Hemoglobin A1c 5.9


-Review CBC and BMP


-PT, OT, ST on consult


-Cardiology consulted for possible PFO and for YUMIKO; per neurology's 

recommendation patient should be considered for 30-day event monitor


-Per the neurology team, spoke with vascular surgery (Dr. Coles) and she states 

that it is possible of the right ICA is greater than 50% stenosed and if the YUMIKO

is unremarkable, intervention may be considered with left-sided TCAR, and 

possible carotid endarterectomy


-YUMIKO scheduled for Friday with Dr. Mendez





#Syncopy v Mechanical fall


#Debility


-Echocardiogram pending


-Orthostatics pending


-Cardiac monitoring


-On Aricept, monitor for bradycardic episodes - if occurring, consider D/C





#Complicated urinary tract infection (gram-negative negative bacilli)


-WBC 13.6 with associated tenderness of the bladder


-Urinalysis positive with nitrites and leukocyte Estrace


-Urine culture pending


-Patient will continue with Rocephin 1 g IVPB every 24 hours (received 3 days); 

with symptom improvement within 48 hours, consider course of 5-10 days


-Urine culture positive for ESBL MDRO Klebsiella pneumonia, resistant to 

Rocephin, Rocephin discontinued


-Initiate treatment with 1 g ertapenem daily - once symptoms improve (if culture

and susceptibility results allow) switch to an appropriate oral regimen; 

consider treatment course of 5-10 days [first dose of ertapenem given on 1/30/2

025]


-Infectious diseases been consulted


-Continue on NS 20 cc/h


-Monitor CBC, BMP





Chronic conditions:


#Dementia - continue home medications


#Hypothyroidism - Continue home medications


#Chronic kidney disease stage IIIb





GI prophylaxis: Not indicated


DVT prophylaxis: Heparin 5000 units every 12 hours





Dictation was produced using dragon dictation software. please excuse any 

grammatical, word or spelling errors. 





I saw and evaluated the patient during the key and critical portions of this 

encounter, and discussed the case in detail with the resident author of this 

note, I agree with the Assessment and Plan, and my changes, if any, are 

highlighted below.





YUMIKO shows no thrombus, no ASD or PFO, heavily calcified mitral leaflet base. 

Discussed with Lila SHOOK, possible Vascular intervention on Tuesday. Alfred SHOOK 

(CHU[facility]) updated with regard to the plans.





Objective





- Vital Signs


Vital signs: 


                                   Vital Signs











Temp  98.1 F   01/31/25 04:32


 


Pulse  87   01/31/25 04:32


 


Resp  18   01/31/25 04:32


 


BP  161/82   01/31/25 04:32


 


Pulse Ox  95   01/31/25 04:32


 


FiO2      








                                 Intake & Output











 01/30/25 01/31/25 01/31/25





 18:59 06:59 18:59


 


Intake Total 240 10 


 


Output Total  800 


 


Balance 240 -790 


 


Weight  107.5 kg 


 


Intake:   


 


  IV  10 


 


    Invasive Line 3  10 


 


  Oral 240  


 


Output:   


 


  Urine  800 


 


Other:   


 


  Voiding Method External Catheter External Catheter 


 


  # Voids 1  


 


  # Bowel Movements 2 1 














- Labs


CBC & Chem 7: 


                                 01/31/25 05:35





                                 01/31/25 05:35


Labs: 


                  Abnormal Lab Results - Last 24 Hours (Table)











  01/30/25 01/30/25 01/30/25 Range/Units





  05:37 11:26 16:17 


 


MCHC     (31.0-37.0)  g/dL


 


Chloride  108 H    ()  mmol/L


 


BUN  18 H    (7-17)  mg/dL


 


Creatinine  1.29 H    (0.52-1.04)  mg/dL


 


Glucose  118 H    (74-99)  mg/dL


 


POC Glucose (mg/dL)   132 H  131 H  ()  mg/dL














  01/30/25 01/31/25 01/31/25 Range/Units





  20:19 05:35 05:35 


 


MCHC   30.8 L   (31.0-37.0)  g/dL


 


Chloride     ()  mmol/L


 


BUN    21 H  (7-17)  mg/dL


 


Creatinine    1.47 H  (0.52-1.04)  mg/dL


 


Glucose    105 H  (74-99)  mg/dL


 


POC Glucose (mg/dL)  140 H    ()  mg/dL














  01/31/25 Range/Units





  05:55 


 


MCHC   (31.0-37.0)  g/dL


 


Chloride   ()  mmol/L


 


BUN   (7-17)  mg/dL


 


Creatinine   (0.52-1.04)  mg/dL


 


Glucose   (74-99)  mg/dL


 


POC Glucose (mg/dL)  126 H  ()  mg/dL

## 2025-01-31 NOTE — P.PN
Subjective


Progress Note Date: 01/31/25


I am following up with the patient and she is about the same.








Objective





- Vital Signs


Vital signs: 


                                   Vital Signs











Temp  97.9 F   01/31/25 11:28


 


Pulse  63   01/31/25 11:28


 


Resp  20   01/31/25 11:28


 


BP  142/63   01/31/25 11:28


 


Pulse Ox  99   01/31/25 11:28


 


FiO2      








                                 Intake & Output











 01/30/25 01/31/25 01/31/25





 18:59 06:59 18:59


 


Intake Total 240 10 118


 


Output Total  800 


 


Balance 240 -790 118


 


Weight  107.5 kg 


 


Intake:   


 


  IV  10 


 


    Invasive Line 3  10 


 


  Oral 240  118


 


Output:   


 


  Urine  800 


 


Other:   


 


  Voiding Method External Catheter External Catheter 


 


  # Voids 1  


 


  # Bowel Movements 2 1 














- Exam


General: Lying in bed and is not in acute distress.


HENT: Supple neck.


Neuro:


Limited since patient has underlying dementia with speech difficulty.


Patient is awake alert oriented to self.  She is following simple commands.  

Limited language. 


The pupils are round about 4 mm and reactive to light.  Visual fields are full 

to confrontation.  Extraocular movements intact no nystagmus.  Normal facial 

sensation to touch.  Patient does have mild left lower facial droop.  No 

dysarthria


Tongue is midline moves side-to-side with any difficulty


Motor: Right upper extremity 4. Right lower extremity is 1-2 with multiple 

attempts. Left upper lower extremity is 5 out of 5.


Sensation: She could not tell me if it feels normal to touch but it seems that 

she had normal painful stimuli bilaterally.


Reflexes 2 positive throughout except for the left lower extremity is a 1 

positive.


Plantars are mute.





Some of the work-up during this hospital visit conisted of:


Lipid panel is triglycerides 94, cholesterol is 132, LDL 66 and HDL is 47.


Hemoglobin A1c is 5.9.


CT of the head is reported as similar mild ventriculomegaly likely due to 

central cerebral atrophy.  Correlate with symptoms to exclude component of NPH. 

Mild to moderate patchy border of chronic small vessel ischemic disease.  No 

acute intracranial abnormality seen.  I personally reviewed the CT and agree 

there is no acute or subacute stroke.


CTA neck: Atherosclerotic changes of bilateral carotid bifurcation this results 

moderate 65% proximal right ICA stenosis on the right and moderate just over 50%

proximal left ICA stenosis on the left.  No hemodynamically significant ICA 

stenosis identified.  Anatomic variation with takeoff of the left vertebral 

artery directly from the aortic arch.


CTA head: No large vessel intracranial arterial occlusion, significant stenosis 

or aneurysm changes seen


2D echo: Normal left ventricular size and stock function with mild to moderate 

concentric left ventricular hypertrophy.  Mild right ventricular prominence.  

Moderate dilation of both atria.  There is heavy mitral annular calcification 

with thickening of mitral valve leaflets and some calcific mild stenosis.  

Aortic valve reveals some sclerosis no restriction.  Saline contrast suggested 

possible PFO


Carotid Duplex is reported as some elevation in velocity but with proximal right

ICA indicating moderate 50 to 69% stenosis.  No hemodynamically significant 

internal carotid artery stenosis on either side.


MRI of the brain is reported as acute/subacute ischemic within the posterior 

aspect of the left caudate nucleus body in the left splenium of the corpus 

callosum.


24-hour post IV TNK is reported as moderate patchy burden of chronic small 

vessel ischemic disease.  Hypodensity along the left basal ganglia and splenium 

of the left corpus callosum minimally more pronounced corresponding to the site 

of acute/subacute infarct on MRI earlier today.  No hemorrhagic transformation





- Labs


CBC & Chem 7: 


                                 01/31/25 05:35





                                 01/31/25 05:35


Labs: 


                  Abnormal Lab Results - Last 24 Hours (Table)











  01/30/25 01/30/25 01/31/25 Range/Units





  16:17 20:19 05:35 


 


MCHC    30.8 L  (31.0-37.0)  g/dL


 


BUN     (7-17)  mg/dL


 


Creatinine     (0.52-1.04)  mg/dL


 


Glucose     (74-99)  mg/dL


 


POC Glucose (mg/dL)  131 H  140 H   ()  mg/dL














  01/31/25 01/31/25 01/31/25 Range/Units





  05:35 05:55 11:34 


 


MCHC     (31.0-37.0)  g/dL


 


BUN  21 H    (7-17)  mg/dL


 


Creatinine  1.47 H    (0.52-1.04)  mg/dL


 


Glucose  105 H    (74-99)  mg/dL


 


POC Glucose (mg/dL)   126 H  115 H  ()  mg/dL














Assessment and Plan


Assessment: 


This is a 69-year-old woman present emergency department on 01/27/2025 because 

of a fall the night prior.  Seems the patient has recurrent falls.  This 

hospital visit seems to the patient has probable acute urinary tract infection. 

Today it was noted the patient had right sided weakness and left facial droop 

upon being evaluated by physical therapy around 10-izzy a.m. and last normal 

reported around 8-izzy a.m. by resident from primary team.  





Acute right sided weakness (predominately right lower >> upper) as well as left 

facial droop is likely acute ischemic stroke status post IV TNK.  Her initial 

NIH stroke scale was a 7 and the repeat was a 5.  MRI Brain reveals 

acute/subacute ischemic within the posterior aspect of the left caudate nucleus 

body in the left splenium of the corpus callosum.  Unknown exact.  No afib or 

flutter.  Has possible PFO on 2D echo.


Right ICA stenosis of about 65% and left ICA of 50% on CT angiography but 

carotid duplex right is 50-69% and that seems asymptomatic.  While on left ICA 

on duplex is no hemodynamically significant stenosis which is discordant on both

imaging.


Acute urinary tract infection


Recurrent falls


Underlying history of dementia and per the resident from primary team he states 

the patient confabulates at baseline


Underlying history of developmentally delayed


Hypothyroidism


Diabetes mellitus





 


Plan: 


Repeat CT head is negative for bleed.


She was resumed on ASA 81mg daily by primary team.  I also added Plavix 75mg 

daily.


Continue Lipitor 40 mg daily.


Cardiology team consulted  for possible PFO and schedule for YUMIKO today..


Recommend 30 days event monitor.


Continue neurochecks 


Cardiac monitoring


PT OT and SLP are consulted


Consulted vascular surgery team for the carotid stenosis.  I spoke with the 

vascular surgeon (Dr. Coles) team and she stated she think right ICA is greater 

than 50% stenosis and if YUMIKO is unremarkable she would like to consider 

intervention.  They are considering TCAR, possible carotid endarterectomy


Will defer the rest of the medical management to primary and other specialist


For DVT prophylaxis: On subq heparin 5000 unit every 12 hours.





Plan discussed with her nurse.


Dr. Dueñas will resume neurology service tomorrow A.M.


Time with Patient: Less than 30

## 2025-01-31 NOTE — P.PN
Subjective


Progress Note Date: 01/31/25


Principal diagnosis: 





Carotid stenosis





Patient is seen and examined today as a follow-up.  She is alert and oriented.  

She still complains of weakness on her right side, right lower extremity greater

than upper extremity.  She denies any new focal deficits.  She is scheduled to u

[facility] YUMIKO this afternoon with cardiology.





Objective





- Vital Signs


Vital signs: 


                                   Vital Signs











Temp  97.7 F   01/31/25 07:53


 


Pulse  76   01/31/25 07:53


 


Resp  18   01/31/25 07:53


 


BP  169/70   01/31/25 08:30


 


Pulse Ox  99   01/31/25 07:53


 


FiO2      








                                 Intake & Output











 01/30/25 01/31/25 01/31/25





 18:59 06:59 18:59


 


Intake Total 240 10 118


 


Output Total  800 


 


Balance 240 -790 118


 


Weight  107.5 kg 


 


Intake:   


 


  IV  10 


 


    Invasive Line 3  10 


 


  Oral 240  118


 


Output:   


 


  Urine  800 


 


Other:   


 


  Voiding Method External Catheter External Catheter 


 


  # Voids 1  


 


  # Bowel Movements 2 1 














- Exam





General appearance: The patient is alert, oriented, appears in no acute 

distress.  Obese.


HET: Head is normocephalic and atraumatic.  Pupils are equal and reactive.  


Neck: Supple.  No audible bruit.


Heart: Regular.


Lungs: Equal expansion, normal respiratory effort.


Abdomen: Soft, nontender, nondistended.


Extremities: Normal skin color and turgor.  Palpable bilateral radial and DP 

pulses.


Neurological: Patient is alert and oriented.  Speech is fluent, answers ques

tions appropriately.  Right sided upper and lower extremity weakness, lower 

extremity greater than upper.








- Labs


CBC & Chem 7: 


                                 01/31/25 05:35





                                 01/31/25 05:35


Labs: 


                  Abnormal Lab Results - Last 24 Hours (Table)











  01/30/25 01/30/25 01/30/25 Range/Units





  11:26 16:17 20:19 


 


MCHC     (31.0-37.0)  g/dL


 


BUN     (7-17)  mg/dL


 


Creatinine     (0.52-1.04)  mg/dL


 


Glucose     (74-99)  mg/dL


 


POC Glucose (mg/dL)  132 H  131 H  140 H  ()  mg/dL














  01/31/25 01/31/25 01/31/25 Range/Units





  05:35 05:35 05:55 


 


MCHC  30.8 L    (31.0-37.0)  g/dL


 


BUN   21 H   (7-17)  mg/dL


 


Creatinine   1.47 H   (0.52-1.04)  mg/dL


 


Glucose   105 H   (74-99)  mg/dL


 


POC Glucose (mg/dL)    126 H  ()  mg/dL














Assessment and Plan


Assessment: 





1.  Symptomatic left ICA stenosis, greater than 50%


2.  Acute/subacute ischemic stroke within the posterior aspect of left caudate 

nucleus body and left splenium of the corpus callosum with symptoms of right 

sided weakness


3.  Possible PFO reported on echocardiogram


4.  Recent fall


5.  Urinary tract infection


6.  Hypertension


7.  Dementia


Plan: 





1.  Carotid duplex ordered and reviewed


2.  Neurology on consultation, appreciate their recommendations


3.  Patient is status post T.enecteplase


4.  PT, OT, ST on consult


5.  Cardiology on consultation,  will need cardiac clearance for left TCAR, 

possible carotid endarterectomy


6.  Lower extremity venous duplex negative for DVT


6.  Further recommendations forthcoming based on clinical course


Thank you for this consultation, we will follow along.








The impression and plan of care has been dictated as directed.





Dr. Ailyn Bauman performed a history and examination of this patient,  discussed the same with 

the dictator.  I agree with the dictator's note ,documented as a scribe.  Any 

additional findings or plans will be noted.

## 2025-01-31 NOTE — P.PN
Subjective


Progress Note Date: 01/31/25


Principal diagnosis: 





Reason for follow-up is ESBL Klebsiella UTI





Patient is a 69-year-old  female with a past medical history 

significant for diabetes mellitus hypertension renal disease and dementia has 

been brought to the hospital for evaluation of fall weakness mental status 

changes he did have elevated white count positive UA concerning for symptomatic 

UTI urine culture positive for MDRO ESBL Klebsiella prompted this consultation.


On today's evaluation that is 01/31/2025, the patient continues to be afebrile, 

the patient is on room air and breathing comfortably, the Pt denies having any 

chest pain or cough, the patient denies having any abdominal pain no vomiting or

any diarrhea patient mention feeling better.


Patient white count is 8.1 creatinine is 1.47 ultrasound abdomen did not show 

any hydronephrosis left kidney with possible minimal pelvicaliectasis, marker of

early hydronephrosis





Objective





- Vital Signs


Vital signs: 


                                   Vital Signs











Temp  97.9 F   01/31/25 11:28


 


Pulse  63   01/31/25 11:28


 


Resp  20   01/31/25 11:28


 


BP  142/63   01/31/25 11:28


 


Pulse Ox  99   01/31/25 11:28


 


FiO2      








                                 Intake & Output











 01/30/25 01/31/25 01/31/25





 18:59 06:59 18:59


 


Intake Total 240 10 118


 


Output Total  800 


 


Balance 240 -790 118


 


Weight  107.5 kg 


 


Intake:   


 


  IV  10 


 


    Invasive Line 3  10 


 


  Oral 240  118


 


Output:   


 


  Urine  800 


 


Other:   


 


  Voiding Method External Catheter External Catheter 


 


  # Voids 1  


 


  # Bowel Movements 2 1 














- Exam





GENERAL DESCRIPTION: An elderly female lying in bed in no distress





RESPIRATORY SYSTEM: Unlabored breathing , decreased breath sounds at bases





HEART: S1 S2 regular rate and rhythm ,





ABDOMEN: Soft , no tenderness





EXTREMITIES: No edema feet





- Labs


CBC & Chem 7: 


                                 01/31/25 05:35





                                 01/31/25 05:35


Labs: 


                  Abnormal Lab Results - Last 24 Hours (Table)











  01/30/25 01/30/25 01/31/25 Range/Units





  16:17 20:19 05:35 


 


MCHC    30.8 L  (31.0-37.0)  g/dL


 


BUN     (7-17)  mg/dL


 


Creatinine     (0.52-1.04)  mg/dL


 


Glucose     (74-99)  mg/dL


 


POC Glucose (mg/dL)  131 H  140 H   ()  mg/dL














  01/31/25 01/31/25 01/31/25 Range/Units





  05:35 05:55 11:34 


 


MCHC     (31.0-37.0)  g/dL


 


BUN  21 H    (7-17)  mg/dL


 


Creatinine  1.47 H    (0.52-1.04)  mg/dL


 


Glucose  105 H    (74-99)  mg/dL


 


POC Glucose (mg/dL)   126 H  115 H  ()  mg/dL














Assessment and Plan


(1) Infection with ESBL Klebsiella oxytoca


Current Visit: Yes   Status: Acute   Code(s): A49.8 - OTHER BACTERIAL INFECTIONS

OF UNSPECIFIED SITE; Z16.12 - EXTENDED SPECTRUM BETA LACTAMASE (ESBL) RESISTANCE

  SNOMED Code(s): 6060202217


   





(2) Urinary tract infection


Current Visit: Yes   Status: Acute   Code(s): N39.0 - URINARY TRACT INFECTION, 

SITE NOT SPECIFIED   SNOMED Code(s): 35715861


   


Plan: 





1patient presented hospital with weakness fall did have some mental status 

changes elevated white count positive UA concerning for symptomatic UTI 

contributing to her symptoms urine positive for ESBL Klebsiella.


2patient did have ultrasound of the kidney bladder t concern for early 

hydronephrosis on the left side


3patient is afebrile white count normalized currently being treated with Invanz

will need a midline and short course of IV Invanz on discharge discussed with 

the admitting physician


Dictation was produced using dragon dictation software. please excuse any 

grammatical, word or spelling errors. 








Time with Patient: Less than 30

## 2025-02-01 LAB
ANION GAP SERPL CALC-SCNC: 6 MMOL/L
BUN SERPL-SCNC: 27 MG/DL (ref 7–17)
CALCIUM SPEC-MCNC: 8.9 MG/DL (ref 8.4–10.2)
CHLORIDE SERPL-SCNC: 102 MMOL/L (ref 98–107)
CO2 SERPL-SCNC: 27 MMOL/L (ref 22–30)
GLUCOSE BLD-MCNC: 116 MG/DL (ref 70–110)
GLUCOSE BLD-MCNC: 142 MG/DL (ref 70–110)
GLUCOSE BLD-MCNC: 155 MG/DL (ref 70–110)
GLUCOSE BLD-MCNC: 183 MG/DL (ref 70–110)
GLUCOSE SERPL-MCNC: 173 MG/DL (ref 74–99)
POTASSIUM SERPL-SCNC: 5.1 MMOL/L (ref 3.5–5.1)
SODIUM SERPL-SCNC: 135 MMOL/L (ref 137–145)

## 2025-02-01 NOTE — P.PN
Subjective


Progress Note Date: 02/01/25





Patient seen and examined as a follow-up for possible stroke and carotid 

stenosis.  No acute changes through the night.  No new focal deficits. 





Objective





- Vital Signs


Vital signs: 


                                   Vital Signs











Temp  98.2 F   02/01/25 08:27


 


Pulse  70   02/01/25 08:27


 


Resp  16   02/01/25 08:27


 


BP  172/72   02/01/25 08:27


 


Pulse Ox  96   02/01/25 08:27


 


FiO2      








                                 Intake & Output











 01/31/25 02/01/25 02/01/25





 18:59 06:59 18:59


 


Intake Total 168 590 


 


Balance 168 590 


 


Weight  70.5 kg 


 


Intake:   


 


  IV 50  


 


  Intake, IV Titration  50 





  Amount   


 


    Ertapenem 1 gm In Sodium  50 





    Chloride 0.9% 50 ml @ 100   





    mls/hr IVPB Q24H [facility] Rx#   





    :[id number]   


 


  Oral 118 540 


 


Other:   


 


  Voiding Method  External Catheter External Catheter


 


  # Voids 200  1


 


  # Bowel Movements 1  














- Exam





General appearance: The patient is alert, oriented, appears in no acute 

distress.  Obese.


HET: Head is normocephalic and atraumatic.  Pupils are equal and reactive.  


Neck: Supple.  No audible bruit.


Heart: Regular.


Lungs: Equal expansion, normal respiratory effort.


Abdomen: Soft, nontender, nondistended.


Extremities: Normal skin color and turgor.  Palpable bilateral radial and DP 

pulses.


Neurological: Patient is alert and oriented.  Speech is fluent, answers 

questions appropriately.  








- Labs


CBC & Chem 7: 


                                 01/31/25 05:35





                                 02/01/25 09:09


Labs: 


                  Abnormal Lab Results - Last 24 Hours (Table)











  01/31/25 01/31/25 02/01/25 Range/Units





  16:20 20:21 05:58 


 


Sodium     (137-145)  mmol/L


 


BUN     (7-17)  mg/dL


 


Creatinine     (0.52-1.04)  mg/dL


 


Glucose     (74-99)  mg/dL


 


POC Glucose (mg/dL)  186 H  207 H  116 H  ()  mg/dL














  02/01/25 02/01/25 Range/Units





  09:09 11:14 


 


Sodium  135 L   (137-145)  mmol/L


 


BUN  27 H   (7-17)  mg/dL


 


Creatinine  1.44 H   (0.52-1.04)  mg/dL


 


Glucose  173 H   (74-99)  mg/dL


 


POC Glucose (mg/dL)   142 H  ()  mg/dL














Assessment and Plan


Assessment: 





1.  Right ICA stenosis 65%, left greater than 50% per CTA 


2.  Acute/subacute ischemic stroke within the posterior aspect of left caudate 

nucleus body and left splenium of the corpus callosum with symptoms of right 

sided weakness








Plan: 





Patient seen and examined.  Feeling well.  States she has no issues and wants to

go home.  She does not want to undergo surgical intervention at this time.  We 

did discuss that she likely has greater than 50% stenosis of her left ICA along 

with symptomatology on the CT and MRI findings.  This potentially could put her 

at risk of recurrent stroke.  She seemingly understands.  We did briefly discuss

what options we have for surgical intervention including open surgical repair 

and stent.  At this time again she does not wish to go forward with 

intervention.  Continue with medical management for now and follow-up in the 

office for further discussions.

## 2025-02-01 NOTE — P.PN
Subjective


Progress Note Date: 02/01/25





Reason for Consult (text): 





YUMIKO, event monitor


History of present illness: 





This is a 69-year-old female with no previous cardiac history.  She has a past 

medical history of dementia, developmentally delayed, hypertension, 

hypothyroidism.  We have been asked to evaluate the patient for YUMIKO and event 

monitor.  Patient presented to the hospital due to right sided weakness and 

facial droop and has been diagnosed with an acute ischemic stroke status post IV

TNK.  There was also concern for possible PFO on echocardiogram.  Patient was 

found to have rites ICA stenosis of 65% and left at 50% on CT angiogram he.  

Vascular surgery is on consult and planning for left TCAR or possible carotid 

endarterectomy on Tuesday.  Blood pressure 179/77, heart rate 77, pulse ox 100% 

on room air.  Patient has been started on aspirin, Lipitor, Plavix.  Patient is 

also on IV antibiotics for UTI.





-EKG: Sinus rhythm with no acute ST-T wave changes.


-Laboratory studies: CBC normal.  BUN 18 and creatinine 1.29.  Triglycerides 94,

cholesterol 132, LDL 66, HDL 47


-Home cardiac medications: Lisinopril 20 mg daily.


-Echocardiogram reveals EF 55 to 60% with moderate concentric LVH.  Heavy 

annular calcification and thickening of the mitral valve leaflets with some calc

ific mild stenosis.  Aortic valve reveals some sclerosis no restriction.  Mild 

mitral and tricuspid regurgitation.  No pericardial effusion.  Saline contrast 

suggest possible PFO.





1/31


Patient is scheduled for YUMIKO today with Dr. Mendez to evaluate mitral valve.  

Blood pressure readings remain elevated at 169/70, heart rate 76, pulse ox 99% 

on room air.  Repeat blood work reveals potassium 4.9, BUN 21 creatinine 1.47.





2/1


Yesterday, patient underwent a YUMIKO which revealed:


1.  No evidence of thrombus in LA or DONNA. 


2.  No evidence of ASD or PFO on color flow or bubble study.  No evidence of IV 

septum aneurysm


3.  Degenerative mitral valve annular calcification with heavy calcification of 

posterior mitral leaflet base.  No evidence of rheumatic mitral valve disease


4.  Mild degenerative mitral stenosis mean gradient 2.5 mmHg at heart rate 70 

bpm.  MVA by planimetry 1.5 cm


5.  Moderate biatrial dilatation


6.  Normal LV size and systolic function


Discussed results of YUMIKO with the patient and also discussed recommendations for

insertion of a loop recorder to assess for possibility of atrial fibrillation.  

Blood pressure 159/70, heart rate 77, pulse ox 95% on room air.  Carotid surgery

is planned but a date does not seem to have been determined.  Patient's daughter

will be in to visit her this afternoon and we will plan to discuss the loop 

recorder with patient's daughter at that time.








Physical examination:


Gen: This is a 69-year-old female in no acute distress


VS: reviewed


HEENT: Head is atraumatic, normocephalic. Pupils equal, round. Sclerae is 

anicteric. 


NECK: Supple. No JVD. 


LUNGS: Clear to auscultation. No wheezes or rhonchi.  No intercostal 

retractions.


HEART: Regular rate and rhythm. No murmur. 


ABDOMEN: Soft  No tenderness.


EXTREMITIES: No pedal edema.  No calf tenderness.


NEUROLOGICAL: Patient is awake, alert.


 


Assessment:


Abnormal echocardiogram


Embolic stroke


Mitral stenosis dementia


Developmentally delayed


Hypertension


Hypothyroidism





Plan:


Continue current cardiac medications


Continue the addition of amlodipine but increase to 5 mg daily


Patient will be scheduled for loop recorder insertion


Patient is cleared from cardiology perspective for carotid artery surgery.


Further recommendations to follow based upon clinical course





Nurse practitioner note has been reviewed, I agree with documented findings and 

plan of care.  Patient was seen and examined.








Objective





- Vital Signs


Vital signs: 


                                   Vital Signs











Temp  98.2 F   02/01/25 04:00


 


Pulse  77   02/01/25 04:00


 


Resp  18   02/01/25 04:00


 


BP  159/70   02/01/25 04:00


 


Pulse Ox  95   02/01/25 04:00


 


FiO2      








                                 Intake & Output











 01/31/25 02/01/25 02/01/25





 18:59 06:59 18:59


 


Intake Total 168 590 


 


Balance 168 590 


 


Weight  70.5 kg 


 


Intake:   


 


  IV 50  


 


  Intake, IV Titration  50 





  Amount   


 


    Ertapenem 1 gm In Sodium  50 





    Chloride 0.9% 50 ml @ 100   





    mls/hr IVPB Q24H [facility] Rx#   





    :[id number]   


 


  Oral 118 540 


 


Other:   


 


  Voiding Method  External Catheter 


 


  # Voids 200  


 


  # Bowel Movements 1  














- Labs


CBC & Chem 7: 


                                 01/31/25 05:35





                                 01/31/25 05:35


Labs: 


                  Abnormal Lab Results - Last 24 Hours (Table)











  01/31/25 01/31/25 01/31/25 Range/Units





  11:34 16:20 20:21 


 


POC Glucose (mg/dL)  115 H  186 H  207 H  ()  mg/dL














  02/01/25 Range/Units





  05:58 


 


POC Glucose (mg/dL)  116 H  ()  mg/dL

## 2025-02-01 NOTE — P.PN
Subjective


Progress Note Date: 02/01/25








This is a 69-year-old female patient was brought into the intensive.


Receiving thrombolytics for new onset right-sided weakness and left facial 

droop.  The patient is not have dementia.  She is known to have diabetes 

mellitus with chronic kidney disease.  She is also known to have hypertension 

hyperlipidemia.  She has been having episodes of falls and earlier this morning,

the patient was noted to have right-sided weakness.  CT scan of the head showed 

mild ventriculomegaly and cerebral atrophy.  Chronic small vessel ischemic 

changes.  CTA of the neck showed 65% proximal right ICA and 50% proximal left 

ICA and the carotid Doppler showed no significant hemodynamically stenosis of 

the carotids.  CTA of the head and neck showed no large vessel intracranial 

arterial occlusion.  Initial NIH score was 7.  The patient was given TNK and 

following that she was admitted to the intensive care unit.  Motor function in 

the right upper extremity is adequate.  There is some ongoing weakness in the 

right lower extremity.  She is awake alert and communicating.  Family at the 

bedside.  No vision changes.  No headaches.  No seizure activity.  She has been 

found to have UTI and currently the patient is on IV Rocephin.  Afebrile and 

hemodynamically stable.  Normal coagulation profile.  Creatinine is at 1.4 

consistent with chronic kidney disease with a GFR of 32.  Hemoglobin is at 12.8.





On 1/29/2025, the patient is being seen for a follow-up.  The patient is status 

post TNK for an acute CVA.  She remains awake and alert.  No headaches.  No 

altered mentation.  Significant weakness in the right lower extremity.  Motor 

function is improved in the right upper extremity which is estimated to be 

around 4 out of 5.  No significant facial asymmetry.  Able to swallow.  A 

follow-up CAT scan of the brain was done today and it shows no evidence of any 

bleeds.  There is moderate patchy burden of chronic small vessel ischemic 

disease along with hypodensity along the left basal ganglia may suggest an 

acute/subacute infarct.  There is also abnormalities in the left corpus 

callosum.  Hemodynamically stable.  No significant hypotension.  The white cell 

count of 9.7, hemoglobin 12.3, platelet count is 219.  Creatinine is 1.3 with a 

BUN of 21.  LDL cholesterol is 66.  HbA1c was at 5.9.





On 1/30/2025, the patient is being seen for a follow-up.  Neurologic status is 

essentially unchanged.  No new onset neurological deficits.  Continues to have 

weakness in the right lower extremity.  Able to swallow.  Able to tolerate diet.

Echocardiogram was also completed and the patient was found to have normal LV, m

oderate LVH, moderate dilatation of both atria, mild mitral tricuspid 

regurgitation, saline contrast suggestive of possible PFO.  MRI of the brain was

also completed and the patient was noted to have an acute/subacute ischemia of 

the posterior aspect of the left caudate nucleus and left splenium corpus 

callosum.  Hemodynamically stable.  No fever.  The patient is currently on room 

air oxygen with a pulse ox of 98%.  Cardiac rhythm is sinus.  WBC count of 10.4,

hemoglobin 11.5 and a platelet count of 223.  BUN is 18 with a creatinine of 1.2

and a sodium level is at 139.  Currently on aspirin and Plavix was also added.  

At the same time, the patient was found to have a Klebsiella pneumonia ESBL 

producing urinary tract infection and the patient is currently on IV Invanz.








1/31/2025, patient is being seen for a follow-up.  Neurologically, some modest 

improvement in the right lower extremity motor function.  Still unable to raise 

her leg against gravity.  Hemodynamically stable.  Still on a cardiac rhythm 

that is sinus.  YUMIKO was done.  There is no evidence of ASD or PFO.  There is 

degenerative mitral valve annular calcification.  There is mild mitral stenosis 

and moderate bilateral atrial dilatation and normal LV function.  No evidence of

any intracardiac thrombus.  The patient remains on a combination of aspirin and 

Plavix.  Remains on IV Invanz for a ESBL producing urine tract infection.  I 

will discuss today 0.1, hemoglobin 11.8 and a platelet count of 248.  BUN is 21 

with a creatinine of 1.47 and sodium levels at 139 with a potassium level of 

4.9.





2/1/2025, the patient is neurologically unchanged.  Continues to have weakness 

in the right lower extremity.  No other specific complaints otherwise for now.  

No headaches.  No new onset neurological deficits.  Remains on aspirin and 

Plavix.  Cardiac rhythm remains sinus.  The patient will need a midline for IV 

Invanz treatment.  The patient has a ESBL producing Klebsiella urinary tract 

infection.  The patient is afebrile.  YUMIKO was done.  No evidence of any ASD or 

PFO.  No other intracardiac thrombus.





Objective





- Vital Signs


Vital signs: 


                                   Vital Signs











Temp  98.2 F   02/01/25 08:27


 


Pulse  68   02/01/25 12:25


 


Resp  16   02/01/25 12:25


 


BP  169/72   02/01/25 12:25


 


Pulse Ox  98   02/01/25 12:25


 


FiO2      








                                 Intake & Output











 01/31/25 02/01/25 02/01/25





 18:59 06:59 18:59


 


Intake Total 168 590 240


 


Balance 168 590 240


 


Weight  70.5 kg 


 


Intake:   


 


  IV 50  


 


  Intake, IV Titration  50 





  Amount   


 


    Ertapenem 1 gm In Sodium  50 





    Chloride 0.9% 50 ml @ 100   





    mls/hr IVPB Q24H [facility] Rx#   





    :[id number]   


 


  Oral 118 540 240


 


Other:   


 


  Voiding Method  External Catheter External Catheter


 


  # Voids 200  1


 


  # Bowel Movements 1  














- Exam








General: Lying in bed and is not in acute distress.  Patient has a BMI of 34.7. 

Currently on room air oxygen.


Head exam was generally normal. There was no scleral icterus or corneal arcus. 

Mucous membranes were moist.


Neck was supple and without jugular venous distension, thyromegaly, or carotid 

bruits. Carotids were easily palpable bilaterally. There was no adenopathy.


Lungs were clear to auscultation and percussion, and with normal diaphragmatic 

excursion. No wheezes or rales were noted.


Cardiac exam revealed the PMI to be normally situated and sized. The rhythm was 

regular and no extrasystoles were noted during several minutes of auscultation. 

The first and second heart sounds were normal and physiologic splitting of the s

econd heart sound was noted. There were no murmurs, rubs, clicks, or gallops.


Abdominal exam revealed normal bowel sounds. The abdomen was soft, non-tender, 

and without masses, organomegaly, or appreciable enlargement of the abdominal 

aorta.


Examination of the extremities revealed easily palpable radial, femoral and 

pedal pulses. There was no cyanosis, clubbing or edema.


Examination of the skin revealed no evidence of significant rashes, suspicious 

appearing nevi or other concerning lesions.


Neurologic examination is limited since patient has underlying dementia.


Patient is awake alert oriented to self.  She is following simple commands.  No 

aphasia from limited language


The pupils are round about 4 mm and reactive to light.  Visual fields are full 

to confrontation.  Extraocular movements intact no nystagmus.  Normal facial 

sensation to touch.  Patient does have mild left lower facial droop.  No 

dysarthria


Tongue is midline moves side-to-side with any difficulty


Motor: Right upper extremity right arm extension patient has about a 3 right 

hand  is 3-4.  Patient does have her right upper extremity drift.  Left arm 

flexion is about a 4 -.  Right lower extremity is 0-1.  Left upper lower 

extremity is 5 out of 5.


Sensation: She could not tell me if it feels normal to touch but it seems that 

she had normal painful stimuli bilaterally.


Reflexes 2 positive throughout except for the left lower extremity is a 1 

positive.





- Labs


CBC & Chem 7: 


                                 01/31/25 05:35





                                 02/01/25 09:09


Labs: 


                  Abnormal Lab Results - Last 24 Hours (Table)











  01/31/25 01/31/25 02/01/25 Range/Units





  16:20 20:21 05:58 


 


Sodium     (137-145)  mmol/L


 


BUN     (7-17)  mg/dL


 


Creatinine     (0.52-1.04)  mg/dL


 


Glucose     (74-99)  mg/dL


 


POC Glucose (mg/dL)  186 H  207 H  116 H  ()  mg/dL














  02/01/25 02/01/25 Range/Units





  09:09 11:14 


 


Sodium  135 L   (137-145)  mmol/L


 


BUN  27 H   (7-17)  mg/dL


 


Creatinine  1.44 H   (0.52-1.04)  mg/dL


 


Glucose  173 H   (74-99)  mg/dL


 


POC Glucose (mg/dL)   142 H  ()  mg/dL














Assessment and Plan


Plan: 





Acute CVA with right-sided weakness and left facial droop, status post TNK 

treatment.  Initial NIH score was 7.  CT scan of the brain showed cerebral 

atrophy and CT angiogram of the brain showed no intracranial vascular 

abnormalities and the patient has bilateral carotid artery disease, 65% on the 

right ICA and 50% of the left.  Follow-up CAT scan of the brain showed no acute 

hemorrhage.  Changes in the left corpus callosum and basal ganglia indicating 

acute/subacute infarct.  Neurologically unchanged compared to yesterday.  The 

patient is currently on a combination of aspirin and Plavix.   YUMIKO showed no 

evidence of any ASD, VSD or PFO.  Preserved LV function.  No evidence of any 

intracardiac thrombus.  MRI of the brain showed a subacute CVA of the posterior 

aspect of the left caudate nucleus and left splenium corpus callosum.  The 

patient remains on a combination of aspirin and Plavix.  Neurologically 

unchanged





Bilateral carotid artery disease, vascular surgery is on the case, likely 

nonsurgical





Dementia





Diabetes mellitus type 2





Chronic kidney disease





Hypothyroidism





Hypertension





UTI, secondary to Klebsiella pneumonia, ESBL producing





Plan





Monitor neurological functions 


Clinically stable


Continue aspirin and Plavix


MRI of the brain was completed consistent with CVA/subacute involving  the 

posterior aspect of the left caudate nucleus and left splenium corpus callosum.


Neurology consultation is appreciated


YUMIKO was noted


Cardiac rhythm is sinus


Cardiac monitor


Vascular consultation


Continue IV Invanz


Obtain a midline


M will continue to follow

## 2025-02-01 NOTE — P.PN
Subjective


Progress Note Date: 02/01/25





The patient is a 69-year-old female who is seen in neurologic follow-up, in 

cross coverage for Dr. Tolbert, with the collaboration of Christine Valdovinos, via 

teleneurology.


Please see Dr. Tolbert's consult for detailed history.


The chart has been reviewed.


The patient reports that she is doing fine today.  She does report some weakness

of her "left leg".  She also reports that she has been up walking with physical 

therapy.  According to review of the original consultation, the patient has 

baseline dementia.


Physical therapy did evaluate the patient on 1 occasion.  They report that she 

is unable to stand because of right leg weakness.  The patient denies weakness 

in her upper extremities.


MRI of the brain reveals ischemic infarct involving the left caudate nucleus and

left splenium of the corpus callosum.


Transesophageal echocardiogram was performed yesterday.  Conclusions reveal: 1. 

No evidence of thrombus in the left atrium or left atrial appendage.


2.  No evidence of ASD or PFO.  No evidence of a septal aneurysm.


3.  Degenerative mitral valve annular calcifications with heavy calcification of

posterior mitral leaflet 4.  Mild degenerative mitral stenosis 5.  Moderate 

biatrial dilatation 6.  Normal LV size and systolic function.





Objective





- Vital Signs


Vital signs: 


                                   Vital Signs











Temp  98.2 F   02/01/25 08:27


 


Pulse  68   02/01/25 12:25


 


Resp  16   02/01/25 12:25


 


BP  169/72   02/01/25 12:25


 


Pulse Ox  98   02/01/25 12:25


 


FiO2      








                                 Intake & Output











 01/31/25 02/01/25 02/01/25





 18:59 06:59 18:59


 


Intake Total 168 590 240


 


Balance 168 590 240


 


Weight  70.5 kg 


 


Intake:   


 


  IV 50  


 


  Intake, IV Titration  50 





  Amount   


 


    Ertapenem 1 gm In Sodium  50 





    Chloride 0.9% 50 ml @ 100   





    mls/hr IVPB Q24H [facility] Rx#   





    :[id number]   


 


  Oral 118 540 240


 


Other:   


 


  Voiding Method  External Catheter External Catheter


 


  # Voids 200  1


 


  # Bowel Movements 1  














- Exam





General: The patient is reclining in bed.  She is well-nourished, well-developed

and in no acute distress.


HEENT: Head is atraumatic, normocephalic.  Fundus not visualized.  There is no 

scleral icterus.  Mucous members are moist.


Heart: Regular rate and rhythm


Extremities: There is a very visible area of ecchymosis involving the left knee.


Neurological examination


Mental status: The patient's speech is clear.  She is able to speak in complete,

fluent sentences.  The patient is however, not oriented.


Cranial nerves: Pupils are equal at 3 mm and reactive.  Visual fields are full 

to confrontation.  Extraocular movements are intact.  Facial sensation is 

grossly intact.  There is a left facial droop.


Motor: Upper extremity strength is 5/5 bilaterally.  Lower extremity strength 

(right/left): Hip flexor 0/4.  Ankle plantar flexor 0/5.  Ankle dorsiflexor 0/5.


Coordination: Finger-to-nose and rapid alternating movements are intact.  There 

is no pronator drift.


Deep tendon reflexes: 2+/4+ in the left upper and lower extremities.  Right-

sided reflexes 3+/4+.


Gait: Not assessed





- Labs


CBC & Chem 7: 


                                 01/31/25 05:35





                                 02/01/25 09:09


Labs: 


                  Abnormal Lab Results - Last 24 Hours (Table)











  01/31/25 01/31/25 02/01/25 Range/Units





  16:20 20:21 05:58 


 


Sodium     (137-145)  mmol/L


 


BUN     (7-17)  mg/dL


 


Creatinine     (0.52-1.04)  mg/dL


 


Glucose     (74-99)  mg/dL


 


POC Glucose (mg/dL)  186 H  207 H  116 H  ()  mg/dL














  02/01/25 02/01/25 Range/Units





  09:09 11:14 


 


Sodium  135 L   (137-145)  mmol/L


 


BUN  27 H   (7-17)  mg/dL


 


Creatinine  1.44 H   (0.52-1.04)  mg/dL


 


Glucose  173 H   (74-99)  mg/dL


 


POC Glucose (mg/dL)   142 H  ()  mg/dL














Assessment and Plan


Assessment: 





1.  Acute infarct involving the left caudate nucleus and left splenium of the 

corpus callosum, resulting in right-sided weakness leg greater than arm, status 

post IV TNK.  Her initial NIH stroke scale was a 7 and the repeat was a 5.


2.  Right ICA stenosis of about 65 and left ICA of 50% per CT angiography


3.  Likely acute urinary tract infection


4.  Recurrent falls


5.  Underlying history of dementia and per the resident from primary team he 

states the patient confabulates at baseline


6.  Hypothyroidism


7.  Diabetes mellitus


Plan: 





1.  Continue aspirin 81 mg and Plavix 75 mg for 21 days, then continue with 

Plavix 75 mg, monotherapy


2.  Continue high intensity statin


3.  Physical therapy treatment for strengthening


4.  Recommend 30-day event monitor at discharge


5.  The patient requires safe discharge plan.


6.  Neurology will sign off at this time.  Please call with questions or 

concerns.


Time with Patient: Greater than 30 (45 minutes were spent caring for this 

patient today including, obtaining a history, examining patient, reviewing 

imaging, chart documentation, labs, placing orders and creating this note)

## 2025-02-01 NOTE — P.PN
Subjective


Progress Note Date: 02/01/25


History of present illness;


Patient is a 69-year-old female with a past medical history significant for syed

betes mellitus, thyroid disorder.  She presented to the emergency department 

after falling at home.  She states that she has frequent falls, with the most 

recent one being last night which brought her to the emergency department today.

 She states that she was in her apartment and attempting to clean her apartment,

at that time she states that she lost her footing and fell on her left side.  

She states that she did not hit her head, however she does attest to having felt

some dizziness prior to falling.  She was found on the ground by her son-in-law,

and she is unsure of how long she was on the ground for but believes it to be at

least 2 hours stating that she was too weak to get herself up.  She has no acute

pain anywhere on her body, however notes some generalized soreness in her lower 

extremities and her right shoulder.  She lives alone and states that she is able

to handle most activities of daily living by herself.  She states that she has 

not taken any anticoagulation medications.





1/28/25 - Patient seen and examined at bedside.  She had no events overnight, 

stated she was feeling better.  With no acute complaints.





Was contacted by the nurse at approximately 10:45 AM noting there was new onset 

weakness in the patient's right side, asking if this was new compared to yesterd

ay when the patient was previously seen.  Went down and saw the patient in the 

observation unit to assess, her right arm was in decerebrate positioning and 

noted she had an inability to move right lower extremity, drift when extending 

her right upper extremity.  Additionally, patient was noted to have left-sided 

facial droop, as well as left-sided deviation of the tongue when protruding.  

During this time she was able to fully converse and answer questions 

appropriately, although maintaining her circumstantial speech as well as 

confabulation.  At that time code stroke was called.





1/29/25 - Patient seen and examined at bedside this morning, remaining in the 

ICU for close monitoring.  She states she is feeling better than yesterday.  

Notes increased ability to move her right upper and lower extremity.  It has 

been noted by the nursing staff that she has intermittent moments of the ability

to have function of her right lower extremity.  She has no acute complaints at 

this time and states that the suprapubic tenderness she had been experiencing 

has resolved.





1/30/25 - Patient seen and examined at bedside this morning, she was moved out 

of the ICU yesterday afternoon and is now on cardiac step down, 3 south.  States

she is feeling better this morning than she had been.  Has no acute complaints 

at this time.





1/31/25 - Patient seen and examined at bedside this morning.  States she is 

feeling well.  No new changes in the functional status for right lower extremity

as it is about the same as yesterday.  She is scheduled to undergo a YUMIKO this 

morning to determine if any further steps are to be taken with vascular surgery.

 She has no acute complaints at this time.





2/1/25 - Patient seen and examined at bedside this morning.  States that she is 

feeling well.  No new changes functional status the right lower extremity, but 

the same as yesterday.  YUMIKO completed yesterday showed no evidence of thrombus 

in LA or DONNA, no evidence of ASD or PFO on color flow or bubble study, dege

nerative mitral valve annular calcification with heavy calcification of 

posterior mitral leaflet base without evidence of rheumatic mitral valve 

disease.  Per cardiology, plan is for placement of loop recorder to assess for 

possibility of atrial fibrillation.  Carotid surgery seems to be planned, ho

paulina without a specific date.





PHYSICAL EXAMINATION: 


GENERAL: The patient is alert and oriented x3, with circumstantial speech with 

confabulation


HEENT: Pupils are round and equally reacting to light. EOMI. No scleral icterus.

No conjunctival pallor.


CARDIOVASCULAR: S1 and S2 present. No murmurs, rubs, or gallops. 


PULMONARY: Chest is clear to auscultation, no wheezing or crackles. 


ABDOMEN: Soft, nontender, nondistended, normoactive bowel sounds. No palpable 

organomegaly. 


MUSCULOSKELETAL: No joint swelling or deformity.


EXTREMITIES: No cyanosis, clubbing, or pedal edema. 


NEUROLOGICAL: Some mobility of the right lower extremity restored, able to wigg

le her toes and move her foot at the ankle and plantar and dorsi flexion; drift 

with extension of right upper extremity; right upper extremity noted to be into 

3 repositioning;  strength on the right side improved as compared to 

yesterday; left sided facial droop and tongue deviation 


SKIN: No rashes.





New data today:


Labs: 


Imaging: YUMIKO completed yesterday showed no evidence of thrombus in LA or DONNA, no

evidence of ASD or PFO on color flow or bubble study, degenerative mitral valve 

annular calcification with heavy calcification of posterior mitral leaflet base 

without evidence of rheumatic mitral valve disease.





Assessment and plan


69-year-old female with PMH of diabetes mellitus, thyroid disorder.  Presents 

emergency department following a fall at home in which she notes to having some 

dizziness prior to falling.





#New onset right-sided weakness


#History of previous stroke ~30 years ago


-Continue with aspirin 81 mg daily


-Per neurology recommendation patient started on Plavix 75 mg daily


-Initiated 40 mg Lipitor daily


-Continue neurochecks


-Triglycerides 94.7, cholesterol 132, LDL 66, VLDL 18.94, HDL 47.10


-Hemoglobin A1c 5.9


-Review CBC and BMP


-PT, OT, ST on consult


-Cardiology consulted for possible PFO and for YUMIKO; per neurology's re

commendation patient should be considered for 30-day event monitor


-Per the neurology team, spoke with vascular surgery (Dr. Coles) and she states 

that it is possible of the right ICA is greater than 50% stenosed and if the YUMIKO

is unremarkable, intervention may be considered with left-sided TCAR, and 

possible carotid endarterectomy


-YUMIKO completed with results read and reviewed





#Syncopy v Mechanical fall


#Debility


-Echocardiogram pending


-Orthostatics pending


-Cardiac monitoring


-On Aricept, monitor for bradycardic episodes - if occurring, consider D/C





#Complicated urinary tract infection (gram-negative negative bacilli)


-Patient will continue with Rocephin 1 g IVPB every 24 hours (received 3 days); 

with symptom improvement within 48 hours, consider course of 5-10 days


-Urine culture positive for ESBL MDRO Klebsiella pneumonia, resistant to 

Rocephin, Rocephin discontinued


-Continue with 1 g ertapenem daily (day 3)


-Infectious diseases been consulted; per infectious disease plan is for patient 

to be discharged with IV antibiotics


-Plan for placement of a midline once discharge plan is known (awaiting 

determination from vascular surgery on their plan for possible TCAR or 

endarterectomy)


-Continue on NS 20 cc/h


-Monitor CBC, BMP





#Diabetes mellitus


-Patient remains on sliding scale


-Continue with glucose monitoring





Chronic conditions:


#Dementia - continue home medications


#Hypothyroidism - Continue home medications


#Chronic kidney disease stage IIIb





GI prophylaxis: Not indicated


DVT prophylaxis: Heparin 5000 units every 12 hours





Dictation was produced using dragon dictation software. please excuse any 

grammatical, word or spelling errors. 





I saw and evaluated the patient during the key and critical portions of this 

encounter, and discussed the case in detail with the resident author of this 

note, I agree with the Assessment and Plan, and my changes, if any, are 

highlighted below.





No changes clinically. BMP shows Na 135, BUN 27, Cr 1.44, glu 173.





YUMIKO shows no thrombus, no ASD or PFO, heavily calcified mitral leaflet base. 

Awaiting vascular surgery plans. Will need midline for short course of IV 

antibiotics on discharge due to MDRO UTI.





Objective





- Vital Signs


Vital signs: 


                                   Vital Signs











Temp  98.2 F   02/01/25 04:00


 


Pulse  77   02/01/25 04:00


 


Resp  18   02/01/25 04:00


 


BP  159/70   02/01/25 04:00


 


Pulse Ox  95   02/01/25 04:00


 


FiO2      








                                 Intake & Output











 01/31/25 02/01/25 02/01/25





 18:59 06:59 18:59


 


Intake Total 168 590 


 


Balance 168 590 


 


Weight  70.5 kg 


 


Intake:   


 


  IV 50  


 


  Intake, IV Titration  50 





  Amount   


 


    Ertapenem 1 gm In Sodium  50 





    Chloride 0.9% 50 ml @ 100   





    mls/hr IVPB Q24H [facility] Rx#   





    :[id number]   


 


  Oral 118 540 


 


Other:   


 


  Voiding Method  External Catheter 


 


  # Voids 200  


 


  # Bowel Movements 1  














- Labs


CBC & Chem 7: 


                                 01/31/25 05:35





                                 02/01/25 09:09


Labs: 


                  Abnormal Lab Results - Last 24 Hours (Table)











  01/31/25 01/31/25 01/31/25 Range/Units





  11:34 16:20 20:21 


 


POC Glucose (mg/dL)  115 H  186 H  207 H  ()  mg/dL














  02/01/25 Range/Units





  05:58 


 


POC Glucose (mg/dL)  116 H  ()  mg/dL

## 2025-02-02 VITALS — RESPIRATION RATE: 18 BRPM

## 2025-02-02 LAB
ANION GAP SERPL CALC-SCNC: 6 MMOL/L
BUN SERPL-SCNC: 27 MG/DL (ref 7–17)
CALCIUM SPEC-MCNC: 9.3 MG/DL (ref 8.4–10.2)
CHLORIDE SERPL-SCNC: 103 MMOL/L (ref 98–107)
CO2 SERPL-SCNC: 27 MMOL/L (ref 22–30)
GLUCOSE BLD-MCNC: 117 MG/DL (ref 70–110)
GLUCOSE BLD-MCNC: 125 MG/DL (ref 70–110)
GLUCOSE BLD-MCNC: 160 MG/DL (ref 70–110)
GLUCOSE BLD-MCNC: 200 MG/DL (ref 70–110)
GLUCOSE BLD-MCNC: 459 MG/DL (ref 70–110)
GLUCOSE SERPL-MCNC: 114 MG/DL (ref 74–99)
POTASSIUM SERPL-SCNC: 5 MMOL/L (ref 3.5–5.1)
SODIUM SERPL-SCNC: 136 MMOL/L (ref 137–145)

## 2025-02-02 NOTE — P.PN
Subjective


Progress Note Date: 02/02/25





Patient was seen and examined. Clinically no improvement over the last few days 

with regard to RLE weakness. BMP Na 136, BUN 27, Cr 1.35, glu 114.





General: non toxic, no distress, appears at stated age


Derm: warm, dry


Head: atraumatic, normocephalic, symmetric


Eyes: EOMI, no lid lag, anicteric sclera


Mouth: no lip lesion, mucus membranes moist


Cardiovascular: S1S2 reg, no murmur


Lungs: Decreasd BS bilaterally, no accessory muscle use


Ext: no gross muscle atrophy, no edema, no contractures


Neuro: Strength 5/5 in RU+LUE, 4/5 LLE, 0/5 RLE


Psych: Alert, oriented x 2





Based on my assessment of this patient, this patient meets a high complexity 

level of care.





CVA involving left caudate nucleus and left splenium of the corpus callosum: 

Status post TKA. ASA 81 mg PO QD. Lipitor 40 mg PO QD. Plavix 75 mg PO QD. 

PT/OT/ST on board. Neurology on board.


Right ICA stenosis: ASA, Lipitor and Plavix as above. Per Vascular, patient does

not want any surgical intervention. Will need to discuss with Guardian on 

Monday.


UTI: UCx growing MDRO Klebsiella. Ertapenem 1g IV QD. Per ID, midline for short 

course of IV antibiotics on discharge.


Hypertension: Amlodipine 5 mg PO QD.


Dementia: Abilify 2 mg PO QD. Aricept 10 mg PO QHS.


Hypothyroid: Synthroid 25 mcg PO QD.


Diabetes mellitus: ISS with Accuchecks ACHS.


CKD stage IIIb at baseline.





Plans for discharge to [facility] on Monday. PACE Dr. Fox is aware. Needs 

midline and set up IV Abx prior to discharge. 





CODE STATUS: FULL STEPHANIE


DVT Prophylaxis: Heparin SQ


GI Prophylaxis: 


Designated medical POA if patient is not able to make medical decisions for the

mselves: Guardian





I have reviewed the following consultant notes: Neuro, Vascular, Cardio note.


I have reviewed the results of the following tests: BMP.


I have ordered the following tests: 


I have discussed the care of this patient with the following independent 

historian: 


I have independently interpreted the following test below: 


I have discussed the management of this patient with the following physician: 











Objective





- Vital Signs


Vital signs: 


                                   Vital Signs











Temp  98.1 F   02/02/25 04:00


 


Pulse  76   02/02/25 04:00


 


Resp  16   02/02/25 04:00


 


BP  170/73   02/02/25 04:00


 


Pulse Ox  96   02/02/25 04:00


 


FiO2      








                                 Intake & Output











 02/01/25 02/02/25 02/02/25





 18:59 06:59 18:59


 


Intake Total 462 590 


 


Output Total 400 250 


 


Balance 62 340 


 


Weight  68 kg 


 


Intake:   


 


  Intake, IV Titration  50 





  Amount   


 


    Ertapenem 1 gm In Sodium  50 





    Chloride 0.9% 50 ml @ 100   





    mls/hr IVPB Q24H [facility] Rx#   





    :[id number]   


 


  Oral 462 540 


 


Output:   


 


  Urine 400 250 


 


Other:   


 


  Voiding Method External Catheter External Catheter 


 


  # Voids 1  1


 


  # Bowel Movements 1  














- Labs


CBC & Chem 7: 


                                 01/31/25 05:35





                                 02/02/25 06:09


Labs: 


                  Abnormal Lab Results - Last 24 Hours (Table)











  02/01/25 02/01/25 02/01/25 Range/Units





  09:09 11:14 16:16 


 


Sodium  135 L    (137-145)  mmol/L


 


BUN  27 H    (7-17)  mg/dL


 


Creatinine  1.44 H    (0.52-1.04)  mg/dL


 


Glucose  173 H    (74-99)  mg/dL


 


POC Glucose (mg/dL)   142 H  155 H  ()  mg/dL














  02/01/25 02/02/25 02/02/25 Range/Units





  20:34 05:56 06:09 


 


Sodium    136 L  (137-145)  mmol/L


 


BUN    27 H  (7-17)  mg/dL


 


Creatinine    1.35 H  (0.52-1.04)  mg/dL


 


Glucose    114 H  (74-99)  mg/dL


 


POC Glucose (mg/dL)  183 H  117 H   ()  mg/dL

## 2025-02-02 NOTE — P.PN
Subjective


Progress Note Date: 02/02/25





This is Luis M Feliz NP, I'm dictating on behalf of Dr. Gerardo's H&P and A&P.


Patient was interviewed and examined.





Patient is a pleasant 69-year-old female with no previous cardiac history who 

was brought to the hospital and found to have a stroke.  YUMIKO was completed 2 

days ago with no findings of thrombus or PFO.  This morning patient denies any 

fever, chills, cough.  No chest pain or heart palpitations.








GENERAL: Well-appearing, well-nourished and in no acute distress.


NECK: Supple without JVD or thyromegaly.


LUNGS: Breath sounds clear to auscultation bilaterally.  Respiration equal and 

unlabored.  No wheezes, rales or rhonchi.


HEART: Regular rate and rhythm without murmurs, rubs or gallops.  S1 and S2 

heard.


EXTREMITIES: Normal range of motion, no edema.  No clubbing or cyanosis.  

Peripheral pulses intact and strong.





VITALS:


Temp 98.1, pulse 76, respirations 16, blood pressure 170/73, O2 saturation 96% 

on room air





TELEMETRY:


Sinus mechanism





LABS:


Sodium 136, potassium 5, BUN 27, creatinine 1.35





IMPRESSION:


1.  Abnormal echocardiogram


2.  Embolic stroke


3.  Mitral stenosis dementia


4.  Developmentally delayed


5.  Hypertension


6.  Hypothyroidism





PLAN:


Considering placement of loop implant, this will be better for the patient 

secondary to her developmental disability, she will likely need this long-term.


Continue current medications as prescribed.


Further recommendations based on patient's clinical course.








Objective





- Vital Signs


Vital signs: 


                                   Vital Signs











Temp  98.1 F   02/02/25 04:00


 


Pulse  76   02/02/25 04:00


 


Resp  16   02/02/25 04:00


 


BP  170/73   02/02/25 04:00


 


Pulse Ox  96   02/02/25 04:00


 


FiO2      








                                 Intake & Output











 02/01/25 02/02/25 02/02/25





 18:59 06:59 18:59


 


Intake Total 462 590 


 


Output Total 400 250 


 


Balance 62 340 


 


Weight  68 kg 


 


Intake:   


 


  Intake, IV Titration  50 





  Amount   


 


    Ertapenem 1 gm In Sodium  50 





    Chloride 0.9% 50 ml @ 100   





    mls/hr IVPB Q24H [facility] Rx#   





    :[id number]   


 


  Oral 462 540 


 


Output:   


 


  Urine 400 250 


 


Other:   


 


  Voiding Method External Catheter External Catheter 


 


  # Voids 1  1


 


  # Bowel Movements 1  














- Labs


CBC & Chem 7: 


                                 01/31/25 05:35





                                 02/02/25 06:09


Labs: 


                  Abnormal Lab Results - Last 24 Hours (Table)











  02/01/25 02/01/25 02/01/25 Range/Units





  09:09 11:14 16:16 


 


Sodium  135 L    (137-145)  mmol/L


 


BUN  27 H    (7-17)  mg/dL


 


Creatinine  1.44 H    (0.52-1.04)  mg/dL


 


Glucose  173 H    (74-99)  mg/dL


 


POC Glucose (mg/dL)   142 H  155 H  ()  mg/dL














  02/01/25 02/02/25 02/02/25 Range/Units





  20:34 05:56 06:09 


 


Sodium    136 L  (137-145)  mmol/L


 


BUN    27 H  (7-17)  mg/dL


 


Creatinine    1.35 H  (0.52-1.04)  mg/dL


 


Glucose    114 H  (74-99)  mg/dL


 


POC Glucose (mg/dL)  183 H  117 H   ()  mg/dL

## 2025-02-02 NOTE — P.PN
Subjective


Progress Note Date: 02/02/25


Principal diagnosis: 





Reason for follow-up is ESBL Klebsiella UTI





Patient is a 69-year-old  female with a past medical history 

significant for diabetes mellitus hypertension renal disease and dementia has 

been brought to the hospital for evaluation of fall weakness mental status 

changes he did have elevated white count positive UA concerning for symptomatic 

UTI urine culture positive for MDRO ESBL Klebsiella prompted this consultation.


On today's evaluation that is 02/02/2025, Patient is afebrile patient is 

currently on room air and breathing comfortably in no distress she was sleepy 

but is arousable and mention feeling better no new symptom no vomiting diarrhea 

has been reported.


Patient did have a creatinine 1.35





Objective





- Vital Signs


Vital signs: 


                                   Vital Signs











Temp  98.4 F   02/02/25 09:32


 


Pulse  76   02/02/25 12:31


 


Resp  18   02/02/25 12:31


 


BP  175/75   02/02/25 12:31


 


Pulse Ox  96   02/02/25 12:31


 


FiO2      








                                 Intake & Output











 02/01/25 02/02/25 02/02/25





 18:59 06:59 18:59


 


Intake Total 462 590 


 


Output Total 400 250 


 


Balance 62 340 


 


Weight  68 kg 


 


Intake:   


 


  Intake, IV Titration  50 





  Amount   


 


    Ertapenem 1 gm In Sodium  50 





    Chloride 0.9% 50 ml @ 100   





    mls/hr IVPB Q24H [facility] Rx#   





    :[id number]   


 


  Oral 462 540 


 


Output:   


 


  Urine 400 250 


 


Other:   


 


  Voiding Method External Catheter External Catheter External Catheter


 


  # Voids 1  1


 


  # Bowel Movements 1  














- Exam





GENERAL DESCRIPTION: An elderly female lying in bed in no distress





RESPIRATORY SYSTEM: Unlabored breathing , decreased breath sounds at bases





HEART: S1 S2 regular rate and rhythm ,





ABDOMEN: Soft , no tenderness





EXTREMITIES: No edema feet





- Labs


CBC & Chem 7: 


                                 01/31/25 05:35





                                 02/02/25 06:09


Labs: 


                  Abnormal Lab Results - Last 24 Hours (Table)











  02/01/25 02/02/25 02/02/25 Range/Units





  20:34 05:56 06:09 


 


Sodium    136 L  (137-145)  mmol/L


 


BUN    27 H  (7-17)  mg/dL


 


Creatinine    1.35 H  (0.52-1.04)  mg/dL


 


Glucose    114 H  (74-99)  mg/dL


 


POC Glucose (mg/dL)  183 H  117 H   ()  mg/dL














  02/02/25 Range/Units





  11:20 


 


Sodium   (137-145)  mmol/L


 


BUN   (7-17)  mg/dL


 


Creatinine   (0.52-1.04)  mg/dL


 


Glucose   (74-99)  mg/dL


 


POC Glucose (mg/dL)  160 H  ()  mg/dL














Assessment and Plan


(1) Infection with ESBL Klebsiella oxytoca


Current Visit: Yes   Status: Acute   Code(s): A49.8 - OTHER BACTERIAL INFECTIONS

OF UNSPECIFIED SITE; Z16.12 - EXTENDED SPECTRUM BETA LACTAMASE (ESBL) RESISTANCE

  SNOMED Code(s): 4010396851


   





(2) Urinary tract infection


Current Visit: Yes   Status: Acute   Code(s): N39.0 - URINARY TRACT INFECTION, 

SITE NOT SPECIFIED   SNOMED Code(s): 62392402


   


Plan: 





1patient presented hospital with weakness fall did have some mental status 

changes elevated white count positive UA concerning for symptomatic UTI cont

ributing to her symptoms urine positive for ESBL Klebsiella.


2patient did have ultrasound of the kidney bladder t concern for early 

hydronephrosis on the left side


3patient is afebrile white count normalized, 


4plan is for midline and a 5 to 7-day course of Invanz on discharge


Dictation was produced using dragon dictation software. please excuse any 

grammatical, word or spelling errors. 








Time with Patient: Less than 30

## 2025-02-02 NOTE — P.PN
Subjective


Progress Note Date: 02/01/25


Principal diagnosis: 





Reason for follow-up is ESBL Klebsiella UTI





Patient is a 69-year-old  female with a past medical history 

significant for diabetes mellitus hypertension renal disease and dementia has 

been brought to the hospital for evaluation of fall weakness mental status 

changes he did have elevated white count positive UA concerning for symptomatic 

UTI urine culture positive for MDRO ESBL Klebsiella prompted this consultation.


On today's evaluation that is 02/01/2025, the patient continues to be afebrile 

the patient is more awake and alert she is breathing comfortably on room air no 

chest pain shortness of breath or cough no vomiting or any diarrhea.


Patient did have a creatinine 1.44 no CBC was done today

















Objective





- Vital Signs


Vital signs: 


                                   Vital Signs











Temp  98.2 F   02/01/25 08:27


 


Pulse  68   02/01/25 12:25


 


Resp  16   02/01/25 12:25


 


BP  169/72   02/01/25 12:25


 


Pulse Ox  98   02/01/25 12:25


 


FiO2      








                                 Intake & Output











 01/31/25 02/01/25 02/01/25





 18:59 06:59 18:59


 


Intake Total 168 590 240


 


Output Total   150


 


Balance 168 590 90


 


Weight  70.5 kg 


 


Intake:   


 


  IV 50  


 


  Intake, IV Titration  50 





  Amount   


 


    Ertapenem 1 gm In Sodium  50 





    Chloride 0.9% 50 ml @ 100   





    mls/hr IVPB Q24H [facility] Rx#   





    :[id number]   


 


  Oral 118 540 240


 


Output:   


 


  Urine   150


 


Other:   


 


  Voiding Method  External Catheter External Catheter


 


  # Voids 200  1


 


  # Bowel Movements 1  1














- Exam





GENERAL DESCRIPTION: An elderly female lying in bed in no distress





RESPIRATORY SYSTEM: Unlabored breathing , decreased breath sounds at bases





HEART: S1 S2 regular rate and rhythm ,





ABDOMEN: Soft , no tenderness





EXTREMITIES: No edema feet





- Labs


CBC & Chem 7: 


                                 01/31/25 05:35





                                 02/02/25 06:09


Labs: 


                  Abnormal Lab Results - Last 24 Hours (Table)











  01/31/25 01/31/25 02/01/25 Range/Units





  16:20 20:21 05:58 


 


Sodium     (137-145)  mmol/L


 


BUN     (7-17)  mg/dL


 


Creatinine     (0.52-1.04)  mg/dL


 


Glucose     (74-99)  mg/dL


 


POC Glucose (mg/dL)  186 H  207 H  116 H  ()  mg/dL














  02/01/25 02/01/25 Range/Units





  09:09 11:14 


 


Sodium  135 L   (137-145)  mmol/L


 


BUN  27 H   (7-17)  mg/dL


 


Creatinine  1.44 H   (0.52-1.04)  mg/dL


 


Glucose  173 H   (74-99)  mg/dL


 


POC Glucose (mg/dL)   142 H  ()  mg/dL














Assessment and Plan


(1) Infection with ESBL Klebsiella oxytoca


Current Visit: Yes   Status: Acute   Code(s): A49.8 - OTHER BACTERIAL INFECTIONS

OF UNSPECIFIED SITE; Z16.12 - EXTENDED SPECTRUM BETA LACTAMASE (ESBL) RESISTANCE

  SNOMED Code(s): 6130710490


   





(2) Urinary tract infection


Current Visit: Yes   Status: Acute   Code(s): N39.0 - URINARY TRACT INFECTION, 

SITE NOT SPECIFIED   SNOMED Code(s): 91487223


   


Plan: 





1patient presented hospital with weakness fall did have some mental status 

changes elevated white count positive UA concerning for symptomatic UTI 

contributing to her symptoms urine positive for ESBL Klebsiella.


2patient did have ultrasound of the kidney bladder t concern for early 

hydronephrosis on the left side


3patient is afebrile white count normalized, seem to have responded to 

intravenous, to continue and monitor clinical course closely


Dictation was produced using dragon dictation software. please excuse any 

grammatical, word or spelling errors. 








Time with Patient: Less than 30

## 2025-02-03 VITALS — HEART RATE: 79 BPM | TEMPERATURE: 98.7 F | SYSTOLIC BLOOD PRESSURE: 175 MMHG | DIASTOLIC BLOOD PRESSURE: 72 MMHG

## 2025-02-03 LAB
GLUCOSE BLD-MCNC: 110 MG/DL (ref 70–110)
GLUCOSE BLD-MCNC: 143 MG/DL (ref 70–110)
GLUCOSE BLD-MCNC: 154 MG/DL (ref 70–110)

## 2025-02-03 PROCEDURE — 05HF33Z INSERTION OF INFUSION DEVICE INTO LEFT CEPHALIC VEIN, PERCUTANEOUS APPROACH: ICD-10-PCS

## 2025-02-03 NOTE — P.PN
Subjective








HISTORY OF PRESENT ILLNESS: 





This is a 69-year-old female with no previous cardiac history.  She has a past 

medical history of dementia, developmentally delayed, hypertension, 

hypothyroidism.  We have been asked to evaluate the patient for YUMIKO and event 

monitor.  Patient presented to the hospital due to right sided weakness and 

facial droop and has been diagnosed with an acute ischemic stroke status post IV

TNK.  There was also concern for possible PFO on echocardiogram.  Patient was 

found to have rites ICA stenosis of 65% and left at 50% on CT angiogram he.  

Vascular surgery is on consult and planning for left TCAR or possible carotid en

darterectomy on Tuesday.  Blood pressure 179/77, heart rate 77, pulse ox 100% on

room air.  Patient has been started on aspirin, Lipitor, Plavix.  Patient is 

also on IV antibiotics for UTI.





-EKG: Sinus rhythm with no acute ST-T wave changes.


-Laboratory studies: CBC normal.  BUN 18 and creatinine 1.29.  Triglycerides 94,

cholesterol 132, LDL 66, HDL 47


-Home cardiac medications: Lisinopril 20 mg daily.


-Echocardiogram reveals EF 55 to 60% with moderate concentric LVH.  Heavy 

annular calcification and thickening of the mitral valve leaflets with some 

calcific mild stenosis.  Aortic valve reveals some sclerosis no restriction.  

Mild mitral and tricuspid regurgitation.  No pericardial effusion.  Saline 

contrast suggest possible PFO.





1/31


Patient is scheduled for YUMIKO today with Dr. Mendez to evaluate mitral valve.  

Blood pressure readings remain elevated at 169/70, heart rate 76, pulse ox 99% 

on room air.  Repeat blood work reveals potassium 4.9, BUN 21 creatinine 1.47.





2/1


Yesterday, patient underwent a YUMIKO which revealed:


1.  No evidence of thrombus in LA or DONNA. 


2.  No evidence of ASD or PFO on color flow or bubble study.  No evidence of IV 

septum aneurysm


3.  Degenerative mitral valve annular calcification with heavy calcification of 

posterior mitral leaflet base.  No evidence of rheumatic mitral valve disease


4.  Mild degenerative mitral stenosis mean gradient 2.5 mmHg at heart rate 70 

bpm.  MVA by planimetry 1.5 cm


5.  Moderate biatrial dilatation


6.  Normal LV size and systolic function


Discussed results of YUMIKO with the patient and also discussed recommendations for

insertion of a loop recorder to assess for possibility of atrial fibrillation.  

Blood pressure 159/70, heart rate 77, pulse ox 95% on room air.  Carotid surgery

is planned but a date does not seem to have been determined.  Patient's daughter

will be in to visit her this afternoon and we will plan to discuss the loop 

recorder with patient's daughter at that time.





2/3/2025


Patient examined this morning at bedside.  Patient currently denies chest pain 

or pressure.  She denies shortness of breath.  Vital signs are stable.  

Telemetry reveals sinus mechanism.








PHYSICAL EXAM: 


VITAL SIGNS: Reviewed.


GENERAL: Well-developed in no acute distress. 


NECK: Supple. No JVD or thyromegaly


LUNGS: Respirations even and unlabored. Lungs essentially clear to auscultation 

bilaterally.


HEART: Regular rate and rhythm.  S1 and S2 heard.


EXTREMITIES: Normal range of motion.  No clubbing or cyanosis.  Peripheral 

pulses intact.  No lower extremity edema





ASSESSMENT: 


Urinary tract infection positive for ESBL Klebsiella


Symptomatic left ICA stenosis


PFO, ruled out per YUMIKO


Mild mitral stenosis 


Dementia


Developmentally delayed


Hypertension


Hypothyroidism





PLAN: 


Event monitor ordered by internal medicine.  However may not be very reliable 

due to patient's developmental delay and inability to operate event monitor 

equipment and charge device.


Consider loop recorder insertion on an outpatient basis


Patient may be discharged today from a cardiac standpoint


Patient to follow-up postdischarge in the office








Nurse practitioner note has been reviewed by physician. Signing provider agrees 

with the documented findings, assessment, and plan of care documented by NP as a

scribe.





Objective





- Vital Signs


Vital signs: 


                                   Vital Signs











Temp  98.0 F   02/03/25 09:43


 


Pulse  68   02/03/25 09:44


 


Resp  18   02/03/25 09:43


 


BP  187/70   02/03/25 09:43


 


Pulse Ox  100   02/03/25 09:43


 


FiO2      








                                 Intake & Output











 02/02/25 02/03/25 02/03/25





 18:59 06:59 18:59


 


Intake Total 358  180


 


Output Total 200 700 


 


Balance 158 -700 180


 


Weight  75 kg 


 


Intake:   


 


  Oral 358  180


 


Output:   


 


  Urine 200 700 


 


Other:   


 


  Voiding Method External Catheter External Catheter External Catheter


 


  # Voids 1  


 


  # Bowel Movements 1  1














- Labs


CBC & Chem 7: 


                                 01/31/25 05:35





                                 02/02/25 06:09


Labs: 


                  Abnormal Lab Results - Last 24 Hours (Table)











  02/02/25 02/02/25 02/02/25 Range/Units





  11:20 16:28 16:42 


 


POC Glucose (mg/dL)  160 H  459 H  200 H  ()  mg/dL














  02/02/25 Range/Units





  20:08 


 


POC Glucose (mg/dL)  125 H  ()  mg/dL

## 2025-02-03 NOTE — P.PN
Subjective


Progress Note Date: 02/03/25


Principal diagnosis: 





Carotid stenosis





Patient is seen and examined today as a follow-up.  No new focal deficits.  No 

changes through the night.  Plan is for discharge on antibiotics for urinary 

tract infection ESBL Klebsiella with midline placement and discharged on IV 

antibiotics. 





Objective





- Vital Signs


Vital signs: 


                                   Vital Signs











Temp  97.9 F   02/03/25 04:00


 


Pulse  76   02/03/25 04:00


 


Resp  18   02/03/25 04:00


 


BP  155/71   02/03/25 04:00


 


Pulse Ox  97   02/03/25 04:00


 


FiO2      








                                 Intake & Output











 02/02/25 02/03/25 02/03/25





 18:59 06:59 18:59


 


Intake Total 358  


 


Output Total 200 700 


 


Balance 158 -700 


 


Weight  75 kg 


 


Intake:   


 


  Oral 358  


 


Output:   


 


  Urine 200 700 


 


Other:   


 


  Voiding Method External Catheter External Catheter 


 


  # Voids 1  


 


  # Bowel Movements 1  1














- Exam





General appearance: The patient is alert, oriented, appears in no acute 

distress.  Obese.


HET: Head is normocephalic and atraumatic.  Pupils are equal and reactive.  


Neck: Supple.  No audible bruit.


Heart: Regular.


Lungs: Equal expansion, normal respiratory effort.


Abdomen: Soft, nontender, nondistended.


Extremities: Normal skin color and turgor.  Palpable bilateral radial and DP 

pulses.


Neurological: Patient is alert and oriented.  Speech is fluent, answers 

questions appropriately.  Right sided upper and lower extremity weakness, lower 

extremity greater than upper.








- Labs


CBC & Chem 7: 


                                 01/31/25 05:35





                                 02/02/25 06:09


Labs: 


                  Abnormal Lab Results - Last 24 Hours (Table)











  02/02/25 02/02/25 02/02/25 Range/Units





  11:20 16:28 16:42 


 


POC Glucose (mg/dL)  160 H  459 H  200 H  ()  mg/dL














  02/02/25 Range/Units





  20:08 


 


POC Glucose (mg/dL)  125 H  ()  mg/dL














Assessment and Plan


Assessment: 





1.  Symptomatic left ICA stenosis, greater than 50%


2.  Acute/subacute ischemic stroke within the posterior aspect of left caudate 

nucleus body and left splenium of the corpus callosum with symptoms of right 

sided weakness


3.  Possible PFO reported on echocardiogram


4.  Recent fall


5.  Urinary tract infection, ESBL Klebsiella on IV antibiotics


6.  Hypertension


7.  Dementia


Plan: 





Patient is currently not interested in carotid surgical intervention and as far 

as timing will need to wait for urinary tract infection treatment and resolution

before carotid intervention.  Recommend patient follow-up with vascular surgery 

in 1 week to further discuss surgical options.  Patient is cleared from vascular

surgery for discharge.





The impression and plan of care has been dictated as directed.





Dr. Johnson


I performed a history and examination of this patient,  discussed the same with 

the dictator.  I agree with the dictator's note ,documented as a scribe.  Any 

additional findings or plans will be noted.

## 2025-02-03 NOTE — P.DS
Providers


Date of admission: 


01/27/25 16:02





Expected date of discharge: 02/03/25


Attending physician: 


Halima Hubbard MD





Consults: 





                                        





01/28/25 11:08


Consult Physician Stat 


   Consulting Provider: Mason Tolbert


   Consult Reason/Comments: New onset right sided weakness and left sided facial

droop


   Do you want consulting provider notified?: Yes





01/28/25 12:01


Consult Physician Routine 


   Consulting Provider: Griffin Lamb


   Consult Reason/Comments: carotid stenosis with code stroke


   Do you want consulting provider notified?: Yes





01/28/25 15:12


Consult Physician Routine 


   Consulting Provider: Chloé Chaudhry


   Consult Reason/Comments: stroke, s/p TNK


   Do you want consulting provider notified?: Yes





01/29/25 13:58


Consult Physician Routine 


   Consulting Provider: Jb Juarez


   Consult Reason/Comments: YUMIKO/Event motinor/stroke


   Do you want consulting provider notified?: Yes





01/29/25 23:26


Consult Physician Routine 


   Consulting Provider: Misa Reyna


   Consult Reason/Comments: Klebsiella ESBL MRDO UTI


   Do you want consulting provider notified?: Yes, Notify in am











Primary care physician: 


John Fox MD





Hospital Course: 


Discharge diagnoses;


# Acute embolic stroke involving left caudate nucleus and left splenium of the 

corpus callosum


#Right ICA stenosis


#Debility


#Complicated urinary tract infection (gram-negative negative bacilli)


#Diabetes mellitus


#Dementia


#Hypothyroidism


#Chronic kidney disease stage IIIb





Hospital course;


69-year-old female with a past medical history significant for diabetes 

mellitus, thyroid disorder.  She presented to the emergency department after 

falling at home.  She states that she has frequent falls, with the most recent 

one being last night which brought her to the emergency department today.  She 

states that she was in her apartment and attempting to clean her apartment, at 

that time she states that she lost her footing and fell on her left side.  She 

states that she did not hit her head, however she does attest to having felt 

some dizziness prior to falling.  She was found on the ground by her son-in-law,

and she is unsure of how long she was on the ground for but believes it to be at

least 2 hours stating that she was too weak to get herself up.  She has no acute

pain anywhere on her body, however notes some generalized soreness in her lower 

extremities and her right shoulder.  During her second at the hospital, we were 

contacted by nursing staff due to sudden onset of weakness on the patient's 

right side, worse in the lower extremity.  Patient was evaluated was noted to 

have decerebrate posturing of her right arm, worsening motor function of the 

right upper extremity, with drifting noted when extending her arm, and no motor 

function of the right lower extremity as well as left-sided facial droop.  A 

culture was called at that time.  Patient was administered TNK and initiated on 

aspirin, Plavix and Lipitor.  Patient was moved to the ICU at the time for close

monitoring.  She remained in the ICU for 1.5-2 days before being moved down to 

the cardiac stepdown floor.  Her condition showed slight improvement in terms of

her motor function, and she remained receiving IV antibiotics.  An event monitor

was ordered, however due to functional capacity, cardiology feels that may be 

beneficial for the patient to have a loop recorder placed outpatient.  

Additionally, after discussion with infectious disease a midline was placed to 

complete a course of IV antibiotics upon discharge.  She will follow-up with 

vascular surgery for possible surgical intervention regarding carotid arteries.





PHYSICAL EXAMINATION: 


GENERAL: The patient is alert and oriented x3, with circumstantial speech with 

confabulation


HEENT: Pupils are round and equally reacting to light. EOMI. No scleral icterus.

No conjunctival pallor.


CARDIOVASCULAR: S1 and S2 present. No murmurs, rubs, or gallops. 


PULMONARY: Chest is clear to auscultation, no wheezing or crackles. 


ABDOMEN: Soft, nontender, nondistended, normoactive bowel sounds. No palpable 

organomegaly. 


MUSCULOSKELETAL: No joint swelling or deformity.


EXTREMITIES: No cyanosis, clubbing, or pedal edema. 


NEUROLOGICAL: Some mobility of the right lower extremity restored, able to 

wiggle her toes and move her foot at the ankle and plantar and dorsi flexion; 

drift with extension of right upper extremity; right upper extremity noted to be

into 3 repositioning;  strength on the right side improved as compared to 

yesterday; left sided facial droop and tongue deviation 


SKIN: No rashes.





Dictation was produced using dragon dictation software. please excuse any 

grammatical, word or spelling errors.








A total of 36 minutes of time were spent preparing this complex discharge 

summary.


Patient was discharged on 2/3/2025 at 1357.





I have seen and evaluated the patient today.  Discussed with the resident and 

agree with the residents finding and plan as documented in the resident's note. 

Changes highlighted in blue font.


Patient Condition at Discharge: Fair





Plan - Discharge Summary


Discharge Rx Participant: No


New Discharge Prescriptions: 


New


   Aspirin 81 mg PO DAILY 14 Days  tab


   Ertapenem [INVanz] 1 gm IVPB Q24H 5 Days  each


   Atorvastatin [Lipitor] 40 mg PO DAILY  tab


   Clopidogrel [Plavix] 75 mg PO DAILY  tab


   amLODIPine [Norvasc] 5 mg PO DAILY  tab





Continue


   metFORMIN HCL [Glucophage] 500 mg PO BID


   Triamcinolone 0.1% Ointment [Kenalog 0.1% Ointment] 1 applic TOPICAL TID


   Mirtazapine [Remeron] 15 mg PO HS


   Levothyroxine Sodium [Synthroid] 25 mcg PO DAILY


   Donepezil [Aricept] 10 mg PO HS


   ondansetron HCL [Zofran] 8 mg PO TID PRN


     PRN Reason: Nausea And Vomiting


   ARIPiprazole [Abilify] 2 mg PO DAILY


   Ergocalciferol (Vitamin D2) [Drisdol (50,000 Iu)] 1,250 mcg PO Q7D





Discontinued


   lisinopriL [Zestril] 20 mg PO DAILY


Discharge Medication List





ARIPiprazole [Abilify] 2 mg PO DAILY 01/27/25 [History]


Donepezil [Aricept] 10 mg PO HS 01/27/25 [History]


Ergocalciferol (Vitamin D2) [Drisdol (50,000 Iu)] 1,250 mcg PO Q7D 01/27/25 

[History]


Levothyroxine Sodium [Synthroid] 25 mcg PO DAILY 01/27/25 [History]


Mirtazapine [Remeron] 15 mg PO HS 01/27/25 [History]


Triamcinolone 0.1% Ointment [Kenalog 0.1% Ointment] 1 applic TOPICAL TID 

01/27/25 [History]


metFORMIN HCL [Glucophage] 500 mg PO BID 01/27/25 [History]


ondansetron HCL [Zofran] 8 mg PO TID PRN 01/27/25 [History]


Aspirin 81 mg PO DAILY 14 Days  tab 02/03/25 [Rx]


Atorvastatin [Lipitor] 40 mg PO DAILY  tab 02/03/25 [Rx]


Clopidogrel [Plavix] 75 mg PO DAILY  tab 02/03/25 [Rx]


Ertapenem [INVanz] 1 gm IVPB Q24H 5 Days  each 02/03/25 [Rx]


amLODIPine [Norvasc] 5 mg PO DAILY  tab 02/03/25 [Rx]








Follow up Appointment(s)/Referral(s): 


John Fox MD [Primary Care Provider] - 1-2 days


Matt Mendez MD [Medical Doctor] - 1 Week


Romi Alford DO [STAFF PHYSICIAN] - 1 Week


[facility] Week


Activity/Diet/Wound Care/Special Instructions: 


Upon discharge follow up with Vascular surgery to discuss possible surgical 

interventions regarding carotid arteries.





Follow up with cardiology following discharge to discuss the possibility of 

having a loop recorder placed





Follow up with your primary care physician





Complete course of IV antibiotics utilizing the placed midline for 5 days 

following discharge on 2/3/25

## 2025-02-04 NOTE — P.PN
Subjective


Progress Note Date: 02/03/25


Principal diagnosis: 





Reason for follow-up is ESBL Klebsiella UTI





Patient is a 69-year-old  female with a past medical history 

significant for diabetes mellitus hypertension renal disease and dementia has 

been brought to the hospital for evaluation of fall weakness mental status 

changes he did have elevated white count positive UA concerning for symptomatic 

UTI urine culture positive for MDRO ESBL Klebsiella prompted this consultation.


On today's evaluation that is 02/03/2025, patient has been afebrile, patient is 

breathing comfortably and is currently on room air, patient denies having any 

significant cough no chest pain, patient denies nausea vomiting or diarrhea and 

no abdominal pain.


No new lab has been obtained today





Objective





- Vital Signs


Vital signs: 


                                   Vital Signs











Temp  98.0 F   02/03/25 09:43


 


Pulse  63   02/03/25 13:51


 


Resp  18   02/03/25 11:41


 


BP  189/74   02/03/25 11:41


 


Pulse Ox  100   02/03/25 11:41


 


FiO2      








                                 Intake & Output











 02/02/25 02/03/25 02/03/25





 18:59 06:59 18:59


 


Intake Total 358  417


 


Output Total 200 700 


 


Balance 158 -700 417


 


Weight  75 kg 75 kg


 


Intake:   


 


  Oral 358  417


 


Output:   


 


  Urine 200 700 


 


Other:   


 


  Voiding Method External Catheter External Catheter External Catheter


 


  # Voids 1  


 


  # Bowel Movements 1  1














- Exam





GENERAL DESCRIPTION: An elderly female lying in bed in no distress





RESPIRATORY SYSTEM: Unlabored breathing , decreased breath sounds at bases





HEART: S1 S2 regular rate and rhythm ,





ABDOMEN: Soft , no tenderness





EXTREMITIES: No edema feet





- Labs


CBC & Chem 7: 


                                 01/31/25 05:35





                                 02/02/25 06:09


Labs: 


                  Abnormal Lab Results - Last 24 Hours (Table)











  02/02/25 02/02/25 02/02/25 Range/Units





  16:28 16:42 20:08 


 


POC Glucose (mg/dL)  459 H  200 H  125 H  ()  mg/dL














  02/03/25 Range/Units





  11:20 


 


POC Glucose (mg/dL)  143 H  ()  mg/dL














Assessment and Plan


(1) Infection with ESBL Klebsiella oxytoca


Status: Acute   Code(s): A49.8 - OTHER BACTERIAL INFECTIONS OF UNSPECIFIED SITE;

Z16.12 - EXTENDED SPECTRUM BETA LACTAMASE (ESBL) RESISTANCE   SNOMED Code(s): 

3363822392


   





(2) Urinary tract infection


Status: Acute   Code(s): N39.0 - URINARY TRACT INFECTION, SITE NOT SPECIFIED   

SNOMED Code(s): 89782897


   


Plan: 





1patient presented hospital with weakness fall did have some mental status 

changes elevated white count positive UA concerning for symptomatic UTI 

contributing to her symptoms urine positive for ESBL Klebsiella.


2patient did have ultrasound of the kidney bladder t concern for early 

hydronephrosis on the left side


3patient is afebrile white count normalized, she did have a midline placement, 

will continue with the Invanz on discharge to finish her course of therapy and 

close outpatient follow-up





Dictation was produced using dragon dictation software. please excuse any 

grammatical, word or spelling errors. 








Time with Patient: Less than 30

## 2025-05-21 NOTE — P.PN
Subjective


Progress Note Date: 02/02/25








This is a 69-year-old female patient was brought into the intensive.


Receiving thrombolytics for new onset right-sided weakness and left facial 

droop.  The patient is not have dementia.  She is known to have diabetes 

mellitus with chronic kidney disease.  She is also known to have hypertension 

hyperlipidemia.  She has been having episodes of falls and earlier this morning,

the patient was noted to have right-sided weakness.  CT scan of the head showed 

mild ventriculomegaly and cerebral atrophy.  Chronic small vessel ischemic 

changes.  CTA of the neck showed 65% proximal right ICA and 50% proximal left 

ICA and the carotid Doppler showed no significant hemodynamically stenosis of 

the carotids.  CTA of the head and neck showed no large vessel intracranial 

arterial occlusion.  Initial NIH score was 7.  The patient was given TNK and 

following that she was admitted to the intensive care unit.  Motor function in 

the right upper extremity is adequate.  There is some ongoing weakness in the 

right lower extremity.  She is awake alert and communicating.  Family at the 

bedside.  No vision changes.  No headaches.  No seizure activity.  She has been 

found to have UTI and currently the patient is on IV Rocephin.  Afebrile and 

hemodynamically stable.  Normal coagulation profile.  Creatinine is at 1.4 

consistent with chronic kidney disease with a GFR of 32.  Hemoglobin is at 12.8.





On 1/29/2025, the patient is being seen for a follow-up.  The patient is status 

post TNK for an acute CVA.  She remains awake and alert.  No headaches.  No 

altered mentation.  Significant weakness in the right lower extremity.  Motor 

function is improved in the right upper extremity which is estimated to be 

around 4 out of 5.  No significant facial asymmetry.  Able to swallow.  A 

follow-up CAT scan of the brain was done today and it shows no evidence of any 

bleeds.  There is moderate patchy burden of chronic small vessel ischemic 

disease along with hypodensity along the left basal ganglia may suggest an 

acute/subacute infarct.  There is also abnormalities in the left corpus 

callosum.  Hemodynamically stable.  No significant hypotension.  The white cell 

count of 9.7, hemoglobin 12.3, platelet count is 219.  Creatinine is 1.3 with a 

BUN of 21.  LDL cholesterol is 66.  HbA1c was at 5.9.





On 1/30/2025, the patient is being seen for a follow-up.  Neurologic status is 

essentially unchanged.  No new onset neurological deficits.  Continues to have 

weakness in the right lower extremity.  Able to swallow.  Able to tolerate diet.

Echocardiogram was also completed and the patient was found to have normal LV, m

oderate LVH, moderate dilatation of both atria, mild mitral tricuspid 

regurgitation, saline contrast suggestive of possible PFO.  MRI of the brain was

also completed and the patient was noted to have an acute/subacute ischemia of 

the posterior aspect of the left caudate nucleus and left splenium corpus 

callosum.  Hemodynamically stable.  No fever.  The patient is currently on room 

air oxygen with a pulse ox of 98%.  Cardiac rhythm is sinus.  WBC count of 10.4,

hemoglobin 11.5 and a platelet count of 223.  BUN is 18 with a creatinine of 1.2

and a sodium level is at 139.  Currently on aspirin and Plavix was also added.  

At the same time, the patient was found to have a Klebsiella pneumonia ESBL 

producing urinary tract infection and the patient is currently on IV Invanz.








1/31/2025, patient is being seen for a follow-up.  Neurologically, some modest 

improvement in the right lower extremity motor function.  Still unable to raise 

her leg against gravity.  Hemodynamically stable.  Still on a cardiac rhythm 

that is sinus.  YUMIKO was done.  There is no evidence of ASD or PFO.  There is 

degenerative mitral valve annular calcification.  There is mild mitral stenosis 

and moderate bilateral atrial dilatation and normal LV function.  No evidence of

any intracardiac thrombus.  The patient remains on a combination of aspirin and 

Plavix.  Remains on IV Invanz for a ESBL producing urine tract infection.  I 

will discuss today 0.1, hemoglobin 11.8 and a platelet count of 248.  BUN is 21 

with a creatinine of 1.47 and sodium levels at 139 with a potassium level of 

4.9.





2/1/2025, the patient is neurologically unchanged.  Continues to have weakness 

in the right lower extremity.  No other specific complaints otherwise for now.  

No headaches.  No new onset neurological deficits.  Remains on aspirin and 

Plavix.  Cardiac rhythm remains sinus.  The patient will need a midline for IV 

Invanz treatment.  The patient has a ESBL producing Klebsiella urinary tract 

infection.  The patient is afebrile.  YUMIKO was done.  No evidence of any ASD or 

PFO.  No other intracardiac thrombus.





2/2/2025, resting comfortably in bed and neurologically unchanged.  Continues to

have weakness in the right lower extremity.  No new onset neurological deficits.

 Currently on aspirin and Plavix.  Currently on IV Invanz for a underlying ESBL 

producing urine tract infection with Klebsiella.  Hemodynamically stable.  BUN 

27 with a creatinine of 1.3.  Sodium levels at 136.  Afebrile.  No headaches.  

She remains on room air oxygen.  No other significant events otherwise for now.





Objective





- Vital Signs


Vital signs: 


                                   Vital Signs











Temp  98.4 F   02/02/25 09:32


 


Pulse  76   02/02/25 12:31


 


Resp  18   02/02/25 12:31


 


BP  175/75   02/02/25 12:31


 


Pulse Ox  96   02/02/25 12:31


 


FiO2      








                                 Intake & Output











 02/01/25 02/02/25 02/02/25





 18:59 06:59 18:59


 


Intake Total 462 590 


 


Output Total 400 250 


 


Balance 62 340 


 


Weight  68 kg 


 


Intake:   


 


  Intake, IV Titration  50 





  Amount   


 


    Ertapenem 1 gm In Sodium  50 





    Chloride 0.9% 50 ml @ 100   





    mls/hr IVPB Q24H [facility] Rx#   





    [id number]   


 


  Oral 462 540 


 


Output:   


 


  Urine 400 250 


 


Other:   


 


  Voiding Method External Catheter External Catheter External Catheter


 


  # Voids 1  1


 


  # Bowel Movements 1  














- Exam








General: Lying in bed and is not in acute distress.  Patient has a BMI of 34.7. 

Currently on room air oxygen.


Head exam was generally normal. There was no scleral icterus or corneal arcus. 

Mucous membranes were moist.


Neck was supple and without jugular venous distension, thyromegaly, or carotid 

bruits. Carotids were easily palpable bilaterally. There was no adenopathy.


Lungs were clear to auscultation and percussion, and with normal diaphragmatic 

excursion. No wheezes or rales were noted.


Cardiac exam revealed the PMI to be normally situated and sized. The rhythm was 

regular and no extrasystoles were noted during several minutes of auscultation. 

The first and second heart sounds were normal and physiologic splitting of the 

second heart sound was noted. There were no murmurs, rubs, clicks, or gallops.


Abdominal exam revealed normal bowel sounds. The abdomen was soft, non-tender, 

and without masses, organomegaly, or appreciable enlargement of the abdominal 

aorta.


Examination of the extremities revealed easily palpable radial, femoral and 

pedal pulses. There was no cyanosis, clubbing or edema.


Examination of the skin revealed no evidence of significant rashes, suspicious 

appearing nevi or other concerning lesions.


Neurologic examination is limited since patient has underlying dementia.


Patient is awake alert oriented to self.  She is following simple commands.  No 

aphasia from limited language


The pupils are round about 4 mm and reactive to light.  Visual fields are full 

to confrontation.  Extraocular movements intact no nystagmus.  Normal facial 

sensation to touch.  Patient does have mild left lower facial droop.  No 

dysarthria


Tongue is midline moves side-to-side with any difficulty


Motor: Right upper extremity right arm extension patient has about a 3 right 

hand  is 3-4.  Patient does have her right upper extremity drift.  Left arm 

flexion is about a 4 -.  Right lower extremity is 0-1.  Left upper lower 

extremity is 5 out of 5.


Sensation: She could not tell me if it feels normal to touch but it seems that 

she had normal painful stimuli bilaterally.


Reflexes 2 positive throughout except for the left lower extremity is a 1 

positive.





- Labs


CBC & Chem 7: 


                                 01/31/25 05:35





                                 02/02/25 06:09


Labs: 


                  Abnormal Lab Results - Last 24 Hours (Table)











  02/01/25 02/01/25 02/02/25 Range/Units





  16:16 20:34 05:56 


 


Sodium     (137-145)  mmol/L


 


BUN     (7-17)  mg/dL


 


Creatinine     (0.52-1.04)  mg/dL


 


Glucose     (74-99)  mg/dL


 


POC Glucose (mg/dL)  155 H  183 H  117 H  ()  mg/dL














  02/02/25 02/02/25 Range/Units





  06:09 11:20 


 


Sodium  136 L   (137-145)  mmol/L


 


BUN  27 H   (7-17)  mg/dL


 


Creatinine  1.35 H   (0.52-1.04)  mg/dL


 


Glucose  114 H   (74-99)  mg/dL


 


POC Glucose (mg/dL)   160 H  ()  mg/dL














Assessment and Plan


Plan: 





Acute CVA with right-sided weakness and left facial droop, status post TNK 

treatment.  Initial NIH score was 7.  CT scan of the brain showed cerebral 

atrophy and CT angiogram of the brain showed no intracranial vascular 

abnormalities and the patient has bilateral carotid artery disease, 65% on the 

right ICA and 50% of the left.  Follow-up CAT scan of the brain showed no acute 

hemorrhage.  Changes in the left corpus callosum and basal ganglia indicating 

acute/subacute infarct.  Neurologically unchanged compared to yesterday.  The 

patient is currently on a combination of aspirin and Plavix.   YUMIKO showed no 

evidence of any ASD, VSD or PFO.  Preserved LV function.  No evidence of any 

intracardiac thrombus.  MRI of the brain showed a subacute CVA of the posterior 

aspect of the left caudate nucleus and left splenium corpus callosum.  The 

patient remains on a combination of aspirin and Plavix.  Neurologically 

unchanged





Bilateral carotid artery disease, vascular surgery is on the case, likely nons

urgical





Dementia





Diabetes mellitus type 2





Chronic kidney disease





Hypothyroidism





Hypertension





UTI, secondary to Klebsiella pneumonia, ESBL producing





Plan





Monitor neurological functions, essentially unchanged over the past 48 hours.  

Continues to have right lower extremity weakness.  The patient is being 

considered to discharge to rehab at [facility].


Clinically stable


Continue aspirin and Plavix


MRI of the brain was completed consistent with CVA/subacute involving  the 

posterior aspect of the left caudate nucleus and left splenium corpus callosum.


Neurology consultation is appreciated


YUMIKO was noted


Cardiac rhythm is sinus


Cardiac monitor


Vascular consultation


Continue IV Invanz to complete a 7-day course


Obtain a midline


No active pulmonary critical care issues and will sign off the case. Care of the patient is governed by the Department of Anesthesia policy and procedure manual.

## 2025-07-03 ENCOUNTER — HOSPITAL ENCOUNTER (OUTPATIENT)
Dept: HOSPITAL 47 - RADBDWWP | Age: 70
Discharge: HOME | End: 2025-07-03
Attending: INTERNAL MEDICINE
Payer: COMMERCIAL

## 2025-07-03 DIAGNOSIS — M85.89: ICD-10-CM

## 2025-07-03 DIAGNOSIS — Z78.0: ICD-10-CM

## 2025-07-03 DIAGNOSIS — M81.0: ICD-10-CM

## 2025-07-03 DIAGNOSIS — Z13.820: Primary | ICD-10-CM

## 2025-07-03 PROCEDURE — 77080 DXA BONE DENSITY AXIAL: CPT

## 2025-07-10 ENCOUNTER — HOSPITAL ENCOUNTER (OUTPATIENT)
Dept: HOSPITAL 47 - RADUSWWP | Age: 70
Discharge: HOME | End: 2025-07-10
Attending: INTERNAL MEDICINE
Payer: COMMERCIAL

## 2025-07-10 DIAGNOSIS — B34.8: ICD-10-CM

## 2025-07-10 DIAGNOSIS — I63.231: Primary | ICD-10-CM

## 2025-07-10 PROCEDURE — 93880 EXTRACRANIAL BILAT STUDY: CPT
